# Patient Record
Sex: MALE | Race: WHITE | NOT HISPANIC OR LATINO | Employment: FULL TIME | URBAN - METROPOLITAN AREA
[De-identification: names, ages, dates, MRNs, and addresses within clinical notes are randomized per-mention and may not be internally consistent; named-entity substitution may affect disease eponyms.]

---

## 2017-02-01 DIAGNOSIS — M19.90 ARTHRITIS: ICD-10-CM

## 2017-02-01 RX ORDER — DICLOFENAC SODIUM 75 MG/1
75 TABLET, DELAYED RELEASE ORAL 2 TIMES DAILY
Qty: 180 TAB | Refills: 3 | Status: SHIPPED | OUTPATIENT
Start: 2017-02-01 | End: 2018-05-10 | Stop reason: SDUPTHER

## 2017-06-14 RX ORDER — ZOLPIDEM TARTRATE 10 MG/1
TABLET ORAL
Qty: 30 TAB | Refills: 1 | Status: SHIPPED
Start: 2017-06-14 | End: 2017-10-04 | Stop reason: SDUPTHER

## 2017-06-14 NOTE — TELEPHONE ENCOUNTER
RX FAXED TO PHARMACY    [id number] - [place], [place] - [address] AT [place] [address] [address] [place] [address]  Phone: [phone] Fax: [phone]

## 2017-08-24 ENCOUNTER — OFFICE VISIT (OUTPATIENT)
Dept: MEDICAL GROUP | Facility: PHYSICIAN GROUP | Age: 57
End: 2017-08-24
Payer: COMMERCIAL

## 2017-08-24 VITALS
WEIGHT: 252 LBS | TEMPERATURE: 97.2 F | DIASTOLIC BLOOD PRESSURE: 80 MMHG | OXYGEN SATURATION: 93 % | SYSTOLIC BLOOD PRESSURE: 124 MMHG | BODY MASS INDEX: 41.99 KG/M2 | RESPIRATION RATE: 20 BRPM | HEIGHT: 65 IN | HEART RATE: 80 BPM

## 2017-08-24 DIAGNOSIS — G89.29 CHRONIC PAIN OF BOTH KNEES: ICD-10-CM

## 2017-08-24 DIAGNOSIS — M25.561 CHRONIC PAIN OF BOTH KNEES: ICD-10-CM

## 2017-08-24 DIAGNOSIS — M25.562 CHRONIC PAIN OF BOTH KNEES: ICD-10-CM

## 2017-08-24 PROCEDURE — 99213 OFFICE O/P EST LOW 20 MIN: CPT | Performed by: FAMILY MEDICINE

## 2017-08-24 RX ORDER — LISINOPRIL 20 MG/1
20 TABLET ORAL
Refills: 5 | COMMUNITY
Start: 2017-07-06 | End: 2017-09-14

## 2017-08-24 NOTE — MR AVS SNAPSHOT
"        Shawn Duffy   2017 11:20 AM   Office Visit   MRN: [id number]    Department:  [facility]   Dept Phone:  [phone]    Description:  Male : 1960   Provider:  Bam Shin M.D.           Reason for Visit     Knee Pain bilateral knee pain, needing orthodics    Blood Pressure Problem discuss need for lisinopril      Allergies as of 2017     No Known Allergies      Vital Signs     Blood Pressure Pulse Temperature Respirations Height Weight    124/80 mmHg 80 36.2 °C (97.2 °F) 20 1.651 m (5' 5\") 114.306 kg (252 lb)    Body Mass Index Oxygen Saturation Smoking Status             41.93 kg/m2 93% Never Smoker          Basic Information     Date Of Birth Sex Race Ethnicity Preferred Language    1960 Male White Non- English      Problem List              ICD-10-CM Priority Class Noted - Resolved    Insomnia G47.00   2009 - Present    Other chronic nonalcoholic liver disease K76.89   2009 - Present    Sleep apnea G47.30   2009 - Present    Hemochromatosis E83.119   2011 - Present    Bulge of lumbar disc without myelopathy M51.26   2/3/2011 - Present    Obesity E66.9   2/3/2011 - Present    Arthritis M19.90   2013 - Present    Vitamin D deficiency disease E55.9   10/7/2013 - Present    Eosinophilic esophagitis K20.0   2015 - Present    Elevated liver function tests R94.5   1/15/2016 - Present    Macrocytosis without anemia D75.89   1/15/2016 - Present    Obesity (BMI 35.0-39.9 without comorbidity) (HCC) E66.9   2016 - Present      Health Maintenance        Date Due Completion Dates    COLONOSCOPY 2009 ---    IMM INFLUENZA (1) 2017 ---    IMM DTaP/Tdap/Td Vaccine (2 - Td) 2023            Current Immunizations     Tdap Vaccine 2013      Below and/or attached are the medications your provider expects you to take. Review all of your home medications and newly ordered medications with your provider and/or pharmacist. " Follow medication instructions as directed by your provider and/or pharmacist. Please keep your medication list with you and share with your provider. Update the information when medications are discontinued, doses are changed, or new medications (including over-the-counter products) are added; and carry medication information at all times in the event of emergency situations     Allergies:  No Known Allergies          Medications  Valid as of: August 24, 2017 - 11:50 AM    Generic Name Brand Name Tablet Size Instructions for use    Carisoprodol (Tab) SOMA 350 MG Take 1 Tab by mouth 4 times a day.        Carisoprodol (Tab) SOMA 350 MG Take 1 Tab by mouth every 8 hours as needed for Muscle Spasms.        Cholecalciferol (Tab) vitamin D3 (cholecalciferol) 1000 UNIT Take 3 Tabs by mouth every day.        ClonazePAM (Tab) KLONOPIN 0.5 MG TAKE 1/2 TO 2 TABLETS ORALLY 3 TIMES A DAY AS NEEDED        Diclofenac Sodium (Tablet Delayed Response) VOLTAREN 75 MG Take 1 Tab by mouth 2 times a day.        Fluticasone Propionate (Suspension) FLONASE 50 MCG/ACT Spray 1 Spray in nose 2 times a day. 1 Spray each nostril twice a day        Hydrocodone-Acetaminophen (Tab) NORCO 5-325 MG TAKE 1 TO 2 TABLETS BY MOUTH EVERY 4 HOURS AS NEEDED FOR PAIN        Lisinopril (Tab) PRINIVIL 20 MG Take 20 mg by mouth.        NON SPECIFIED   Therapeutic Phlebotomy--Please draw one unit of blood a month as needed for treatment of hemochromatosis.Dx E83.119  Minimum Hematocrit 35        Sildenafil Citrate (Tab) VIAGRA 100 MG Take 1 Tab by mouth as needed. FOR ERECTILE DYSFUNCTION.        Zolpidem Tartrate (Tab) AMBIEN 10 MG TAKE 1 TABLET BY MOUTH AT BEDTIME AS NEEDED FOR SLEEP        .                 Medicines prescribed today were sent to:     [id number] - [place], [place] [facility] [address] [place] [address]    Phone: [phone] Fax: [phone]    Open 24 Hours?: No      Medication refill  instructions:       If your prescription bottle indicates you have medication refills left, it is not necessary to call your provider’s office. Please contact your pharmacy and they will refill your medication.    If your prescription bottle indicates you do not have any refills left, you may request refills at any time through one of the following ways: The online TekLinks system (except Urgent Care), by calling your provider’s office, or by asking your pharmacy to contact your provider’s office with a refill request. Medication refills are processed only during regular business hours and may not be available until the next business day. Your provider may request additional information or to have a follow-up visit with you prior to refilling your medication.   *Please Note: Medication refills are assigned a new Rx number when refilled electronically. Your pharmacy may indicate that no refills were authorized even though a new prescription for the same medication is available at the pharmacy. Please request the medicine by name with the pharmacy before contacting your provider for a refill.           TekLinks Access Code: [email or web address]  Expires: 9/23/2017 11:50 AM    TekLinks  A secure, online tool to manage your health information     [employer]’s TekLinks® is a secure, online tool that connects you to your personalized health information from the privacy of your home -- day or night - making it very easy for you to manage your healthcare. Once the activation process is completed, you can even access your medical information using the TekLinks benedict, which is available for free in the Apple Benedict store or Google Play store.     TekLinks provides the following levels of access (as shown below):   My Chart Features   [facility] Doctor [facility]  Specialists [facility]  Urgent  Care Non-[facility]  Primary Care  Doctor   Email your healthcare team securely and privately 24/7 X X X    Manage appointments: schedule your next  appointment; view details of past/upcoming appointments X      Request prescription refills. X      View recent personal medical records, including lab and immunizations X X X X   View health record, including health history, allergies, medications X X X X   Read reports about your outpatient visits, procedures, consult and ER notes X X X X   See your discharge summary, which is a recap of your hospital and/or ER visit that includes your diagnosis, lab results, and care plan. X X       How to register for Curious Hat:  1. Go to  [email or web address].  2. Click on the Sign Up Now box, which takes you to the New Member Sign Up page. You will need to provide the following information:  a. Enter your Curious Hat Access Code exactly as it appears at the top of this page. (You will not need to use this code after you’ve completed the sign-up process. If you do not sign up before the expiration date, you must request a new code.)   b. Enter your date of birth.   c. Enter your home email address.   d. Click Submit, and follow the next screen’s instructions.  3. Create a Curious Hat ID. This will be your Curious Hat login ID and cannot be changed, so think of one that is secure and easy to remember.  4. Create a Curious Hat password. You can change your password at any time.  5. Enter your Password Reset Question and Answer. This can be used at a later time if you forget your password.   6. Enter your e-mail address. This allows you to receive e-mail notifications when new information is available in Curious Hat.  7. Click Sign Up. You can now view your health information.    For assistance activating your Curious Hat account, call [phone]

## 2017-08-24 NOTE — Clinical Note
[facility]  Justin Santacruz M.D.  [address]  [place] [address]  Fax: [phone]   Authorization for Release/Disclosure of   Protected Health Information   Name: LEA DUFFY : 1960 SSN: [id number]   Address: [address] [place] [address] [place] [address] Phone:    [phone] (home)    I authorize the entity listed below to release/disclose the PHI below to:   [facility]/Justin Santacruz M.D. and Bam Shin M.D.   Provider or Entity Name:  [facility]   City, [facility], Zip  [place] Phone:      Fax:     Reason for request: continuity of care   Information to be released:    [  ] LAST COLONOSCOPY,  including any PATH REPORT and follow-up  [  ] LAST FIT/COLOGUARD RESULT [  ] LAST DEXA  [  ] LAST MAMMOGRAM  [  ] LAST PAP  [  ] LAST LABS [  ] RETINA EXAM REPORT  [  ] IMMUNIZATION RECORDS  [xx  ] Release all info xray reports      [  ] Check here and initial the line next to each item to release ALL health information INCLUDING  _____ Care and treatment for drug and / or alcohol abuse  _____ HIV testing, infection status, or AIDS  _____ Genetic Testing    DATES OF SERVICE OR TIME PERIOD TO BE DISCLOSED: _____________  I understand and acknowledge that:  * This Authorization may be revoked at any time by you in writing, except if your health information has already been used or disclosed.  * Your health information that will be used or disclosed as a result of you signing this authorization could be re-disclosed by the recipient. If this occurs, your re-disclosed health information may no longer be protected by State or Federal laws.  * You may refuse to sign this Authorization. Your refusal will not affect your ability to obtain treatment.  * This Authorization becomes effective upon signing and will  on (date) __________.      If no date is indicated, this Authorization will  one (1) year from the signature date.    Name: Lea Duffy    Signature:   Date:     8/24/2017       PLEASE FAX REQUESTED RECORDS BACK TO: [phone]

## 2017-08-25 NOTE — PROGRESS NOTES
Subjective:   Shawn Duffy is a 57 y.o. male here today for requesting prescription for shoe inserts for chronic knee pain    HPI :     Patient has been having chronic knee pain for a few years.[facility] .He has recently been having chronic knee pain bilateral.Has had recent X rays which showed moderate arthritis.  He is doing Physical Therapy and shoe inserts were recommended to him.But the prescription has to come from a physician.  Therefore he is here requesting prescription for shoe inserts bilateral for patellar positioning and Q angle correction.    Current medicines (including changes today)  Current Outpatient Prescriptions   Medication Sig Dispense Refill   • lisinopril (PRINIVIL) 20 MG Tab Take 20 mg by mouth.  5   • zolpidem (AMBIEN) 10 MG Tab TAKE 1 TABLET BY MOUTH AT BEDTIME AS NEEDED FOR SLEEP 30 Tab 1   • diclofenac EC (VOLTAREN) 75 MG Tablet Delayed Response Take 1 Tab by mouth 2 times a day. 180 Tab 3   • vitamin D3, cholecalciferol, 1000 UNIT Tab Take 3 Tabs by mouth every day. 100 Tab 3   • carisoprodol (SOMA) 350 MG Tab Take 1 Tab by mouth every 8 hours as needed for Muscle Spasms. 30 Tab 0   • clonazepam (KLONOPIN) 0.5 MG Tab TAKE 1/2 TO 2 TABLETS ORALLY 3 TIMES A DAY AS NEEDED 40 Tab 0   • hydrocodone-acetaminophen (NORCO) 5-325 MG Tab per tablet TAKE 1 TO 2 TABLETS BY MOUTH EVERY 4 HOURS AS NEEDED FOR PAIN 40 Tab 0   • sildenafil citrate (VIAGRA) 100 MG tablet Take 1 Tab by mouth as needed. FOR ERECTILE DYSFUNCTION. 18 Tab 4   • carisoprodol (SOMA) 350 MG Tab Take 1 Tab by mouth 4 times a day. 40 Tab 2   • NON SPECIFIED Therapeutic Phlebotomy--Please draw one unit of blood a month as needed for treatment of hemochromatosis.Dx E83.119  Minimum Hematocrit 35 12 Units 1   • fluticasone (FLONASE) 50 MCG/ACT nasal spray Spray 1 Spray in nose 2 times a day. 1 Spray each nostril twice a day 1 Bottle 3     No current facility-administered  "medications for this visit.     He  has a past medical history of Disorders of iron metabolism; Obesity, unspecified; Other chronic nonalcoholic liver disease; Organic sleep apnea, unspecified; Reflux esophagitis; and GERD (gastroesophageal reflux disease).    ROS   No chest pain, no shortness of breath, no abdominal pain         Objective:     Blood pressure 124/80, pulse 80, temperature 36.2 °C (97.2 °F), resp. rate 20, height 1.651 m (5' 5\"), weight 114.306 kg (252 lb), SpO2 93 %. Body mass index is 41.93 kg/(m^2).     PHYSICAL EXAM     GEN: Alert and oriented,well appearing, no acute distress  SKIN: warm, dry to touch, no rashes or lesions in visible areas  PSYCH: mood and affect normal, judgement normal  MUSCULOSKELETAL : Normal gait and stance, Knee exam : no TTP, normal ROM  NEURO: No overt focal neurologic deficits,sensation intact      Assessment and Plan:   The following treatment plan was discussed    1. Chronic pain of both knees  Continue Physical Therapy  Prescription given for shoe inserts with detailed instructions as requested      Followup: Return in about 4 weeks (around 9/21/2017) for Long,establish care.         Please note that this dictation was created using voice recognition software. I have made every reasonable attempt to correct obvious errors, but I expect that there are errors of grammar and possibly content that I did not discover before finalizing the note.    "

## 2017-09-05 PROBLEM — M17.0 OSTEOARTHRITIS OF KNEES, BILATERAL: Status: ACTIVE | Noted: 2017-09-05

## 2017-09-14 ENCOUNTER — HOSPITAL ENCOUNTER (OUTPATIENT)
Dept: LAB | Facility: MEDICAL CENTER | Age: 57
End: 2017-09-14
Attending: FAMILY MEDICINE
Payer: COMMERCIAL

## 2017-09-14 ENCOUNTER — OFFICE VISIT (OUTPATIENT)
Dept: MEDICAL GROUP | Facility: PHYSICIAN GROUP | Age: 57
End: 2017-09-14
Payer: COMMERCIAL

## 2017-09-14 VITALS
WEIGHT: 250 LBS | HEIGHT: 65 IN | DIASTOLIC BLOOD PRESSURE: 74 MMHG | OXYGEN SATURATION: 95 % | BODY MASS INDEX: 41.65 KG/M2 | SYSTOLIC BLOOD PRESSURE: 124 MMHG | TEMPERATURE: 97.7 F | HEART RATE: 89 BPM

## 2017-09-14 DIAGNOSIS — M54.50 CHRONIC RIGHT-SIDED LOW BACK PAIN WITHOUT SCIATICA: ICD-10-CM

## 2017-09-14 DIAGNOSIS — Z23 NEED FOR VACCINATION: ICD-10-CM

## 2017-09-14 DIAGNOSIS — Z12.11 SCREEN FOR COLON CANCER: ICD-10-CM

## 2017-09-14 DIAGNOSIS — E55.9 VITAMIN D DEFICIENCY DISEASE: ICD-10-CM

## 2017-09-14 DIAGNOSIS — E83.110 HEREDITARY HEMOCHROMATOSIS (HCC): ICD-10-CM

## 2017-09-14 DIAGNOSIS — G89.29 CHRONIC RIGHT-SIDED LOW BACK PAIN WITHOUT SCIATICA: ICD-10-CM

## 2017-09-14 DIAGNOSIS — F51.01 PRIMARY INSOMNIA: ICD-10-CM

## 2017-09-14 DIAGNOSIS — M17.0 OSTEOARTHRITIS OF BOTH KNEES, UNSPECIFIED OSTEOARTHRITIS TYPE: ICD-10-CM

## 2017-09-14 DIAGNOSIS — E66.01 MORBID OBESITY WITH BMI OF 40.0-44.9, ADULT (HCC): ICD-10-CM

## 2017-09-14 DIAGNOSIS — G47.33 OBSTRUCTIVE SLEEP APNEA SYNDROME: ICD-10-CM

## 2017-09-14 PROBLEM — M25.561 CHRONIC PAIN OF BOTH KNEES: Status: RESOLVED | Noted: 2017-08-24 | Resolved: 2017-09-14

## 2017-09-14 PROBLEM — M25.562 CHRONIC PAIN OF BOTH KNEES: Status: RESOLVED | Noted: 2017-08-24 | Resolved: 2017-09-14

## 2017-09-14 LAB
ALBUMIN SERPL BCP-MCNC: 4.2 G/DL (ref 3.2–4.9)
ALBUMIN/GLOB SERPL: 1.2 G/DL
ALP SERPL-CCNC: 80 U/L (ref 30–99)
ALT SERPL-CCNC: 45 U/L (ref 2–50)
ANION GAP SERPL CALC-SCNC: 9 MMOL/L (ref 0–11.9)
AST SERPL-CCNC: 34 U/L (ref 12–45)
BASOPHILS # BLD AUTO: 0.6 % (ref 0–1.8)
BASOPHILS # BLD: 0.05 K/UL (ref 0–0.12)
BILIRUB SERPL-MCNC: 1.5 MG/DL (ref 0.1–1.5)
BUN SERPL-MCNC: 15 MG/DL (ref 8–22)
CALCIUM SERPL-MCNC: 9.7 MG/DL (ref 8.5–10.5)
CHLORIDE SERPL-SCNC: 106 MMOL/L (ref 96–112)
CO2 SERPL-SCNC: 24 MMOL/L (ref 20–33)
CREAT SERPL-MCNC: 0.82 MG/DL (ref 0.5–1.4)
EOSINOPHIL # BLD AUTO: 0.44 K/UL (ref 0–0.51)
EOSINOPHIL NFR BLD: 5.1 % (ref 0–6.9)
ERYTHROCYTE [DISTWIDTH] IN BLOOD BY AUTOMATED COUNT: 45.1 FL (ref 35.9–50)
GFR SERPL CREATININE-BSD FRML MDRD: >60 ML/MIN/1.73 M 2
GLOBULIN SER CALC-MCNC: 3.4 G/DL (ref 1.9–3.5)
GLUCOSE SERPL-MCNC: 90 MG/DL (ref 65–99)
HCT VFR BLD AUTO: 45.8 % (ref 42–52)
HGB BLD-MCNC: 15.8 G/DL (ref 14–18)
IMM GRANULOCYTES # BLD AUTO: 0.03 K/UL (ref 0–0.11)
IMM GRANULOCYTES NFR BLD AUTO: 0.3 % (ref 0–0.9)
LYMPHOCYTES # BLD AUTO: 1.92 K/UL (ref 1–4.8)
LYMPHOCYTES NFR BLD: 22 % (ref 22–41)
MCH RBC QN AUTO: 34.4 PG (ref 27–33)
MCHC RBC AUTO-ENTMCNC: 34.5 G/DL (ref 33.7–35.3)
MCV RBC AUTO: 99.8 FL (ref 81.4–97.8)
MONOCYTES # BLD AUTO: 0.77 K/UL (ref 0–0.85)
MONOCYTES NFR BLD AUTO: 8.8 % (ref 0–13.4)
NEUTROPHILS # BLD AUTO: 5.5 K/UL (ref 1.82–7.42)
NEUTROPHILS NFR BLD: 63.2 % (ref 44–72)
NRBC # BLD AUTO: 0 K/UL
NRBC BLD AUTO-RTO: 0 /100 WBC
PLATELET # BLD AUTO: 221 K/UL (ref 164–446)
PMV BLD AUTO: 10.8 FL (ref 9–12.9)
POTASSIUM SERPL-SCNC: 4 MMOL/L (ref 3.6–5.5)
PROT SERPL-MCNC: 7.6 G/DL (ref 6–8.2)
RBC # BLD AUTO: 4.59 M/UL (ref 4.7–6.1)
SODIUM SERPL-SCNC: 139 MMOL/L (ref 135–145)
WBC # BLD AUTO: 8.7 K/UL (ref 4.8–10.8)

## 2017-09-14 PROCEDURE — 36415 COLL VENOUS BLD VENIPUNCTURE: CPT

## 2017-09-14 PROCEDURE — 90471 IMMUNIZATION ADMIN: CPT | Performed by: FAMILY MEDICINE

## 2017-09-14 PROCEDURE — 99214 OFFICE O/P EST MOD 30 MIN: CPT | Mod: 25 | Performed by: FAMILY MEDICINE

## 2017-09-14 PROCEDURE — 85025 COMPLETE CBC W/AUTO DIFF WBC: CPT

## 2017-09-14 PROCEDURE — 90686 IIV4 VACC NO PRSV 0.5 ML IM: CPT | Performed by: FAMILY MEDICINE

## 2017-09-14 PROCEDURE — 80053 COMPREHEN METABOLIC PANEL: CPT

## 2017-09-14 RX ORDER — METHOCARBAMOL 500 MG/1
1000 TABLET, FILM COATED ORAL 3 TIMES DAILY
Qty: 60 TAB | Refills: 0 | Status: SHIPPED | OUTPATIENT
Start: 2017-09-14 | End: 2018-02-08

## 2017-09-14 ASSESSMENT — PAIN SCALES - GENERAL: PAINLEVEL: 5=MODERATE PAIN

## 2017-09-14 NOTE — ASSESSMENT & PLAN NOTE
Patient does have chronic low back pain with right sided sciatica.He had in the past seen Spine specialist at [facility] and had received epidural injection. Lately it was not bothering him much except it started to act up recently 4 months ago.Pain mainly in the low back with occasional radiation to the right buttock.MRI in 2011 showed disc bulge at L5-S1 with severe neural foraminal stenosis.Patient is currently managing pain by taking Voltaren 75 twice daily and Robaxin as needed.

## 2017-09-14 NOTE — ASSESSMENT & PLAN NOTE
This is an ongoing issue. Patient has been on Ambien for several years. He usually gets prescription of Ambien 10 mg 15 pills for 25 days. He takes half a tablet daily and that helps him sleep.

## 2017-09-14 NOTE — ASSESSMENT & PLAN NOTE
Patient has sleep apnea. And he uses the CPAP regularly. Reports being compliant and uses it throughout the night. Reports that using the CPAP and taking Ambien to help him fall asleep works well for him.

## 2017-09-14 NOTE — ASSESSMENT & PLAN NOTE
Patient's last vitamin D level in December 2016 was low at 8. He is currently taking vitamin D 3000 units daily. We will recheck vitamin D level.

## 2017-09-17 NOTE — ASSESSMENT & PLAN NOTE
Ongoing issue.X ray showed mild arthritis in left leg.He is currently doing physical therapy for it which seems to be helping.

## 2017-09-17 NOTE — PROGRESS NOTES
Subjective:   Shawn Duffy is a 57 y.o. male here today for establishing care and discuss following chronic medical conditions :    Chronic right-sided low back pain without sciatica  Patient does have chronic low back pain with right sided sciatica.He had in the past seen Spine specialist at [facility] and had received epidural injection. Lately it was not bothering him much except it started to act up recently 4 months ago.Pain mainly in the low back with occasional radiation to the right buttock.MRI in 2011 showed disc bulge at L5-S1 with severe neural foraminal stenosis.Patient is currently managing pain by taking Voltaren 75 twice daily and Robaxin as needed.    Insomnia  This is an ongoing issue. Patient has been on Ambien for several years. He usually gets prescription of Ambien 10 mg 15 pills for 25 days. He takes half a tablet daily and that helps him sleep.    Sleep Apnea  Patient has sleep apnea. And he uses the CPAP regularly. Reports being compliant and uses it throughout the night. Reports that using the CPAP and taking Ambien to help him fall asleep works well for him.    Vitamin D deficiency disease  Patient's last vitamin D level in December 2016 was low at 8. He is currently taking vitamin D 3000 units daily. We will recheck vitamin D level.    Hemochromatosis  Patient reports that he was diagnosed with Hemochromatosis several years back.Last year he was undergoing phlebotomy every 4 weeks. But recently based on labs, he was told by his previous PCP that he did not need phlebotomy.No recent CBC and iron studies on chart in 2017.He denies fatigue, does have ongoing knee pain.    Osteoarthritis of knees, bilateral  Ongoing issue.X ray showed mild arthritis in left leg.He is currently doing physical therapy for it which seems to be helping.         Current medicines (including changes today)  Current Outpatient Prescriptions   Medication Sig Dispense Refill   • methocarbamol (ROBAXIN) 500 MG  "Tab Take 2 Tabs by mouth 3 times a day. 60 Tab 0   • zolpidem (AMBIEN) 10 MG Tab TAKE 1 TABLET BY MOUTH AT BEDTIME AS NEEDED FOR SLEEP 30 Tab 1   • diclofenac EC (VOLTAREN) 75 MG Tablet Delayed Response Take 1 Tab by mouth 2 times a day. 180 Tab 3   • vitamin D3, cholecalciferol, 1000 UNIT Tab Take 3 Tabs by mouth every day. 100 Tab 3     No current facility-administered medications for this visit.      He  has a past medical history of Disorders of iron metabolism; GERD (gastroesophageal reflux disease); Obesity, unspecified; Organic sleep apnea, unspecified; Other chronic nonalcoholic liver disease; and Reflux esophagitis.    ROS  No chest pain, no shortness of breath, no abdominal pain, no cough, no fatigue       Objective:     Blood pressure 124/74, pulse 89, temperature 36.5 °C (97.7 °F), height 1.651 m (5' 5\"), weight 113.4 kg (250 lb), SpO2 95 %. Body mass index is 41.6 kg/m².     PHYSICAL EXAM     GEN: Alert and oriented,well appearing, no acute distress  SKIN: warm, dry to touch, no rashes or lesions in visible areas  PSYCH: mood and affect normal, judgement normal  EYES: Conjunctiva clear, lids normal,pupils equal round and reactive  ENMT: Normal external nose and ears,EACs normal appearing, TM pearly gray with normal light reflex bilaterally,nasal mucosa and turbinates normal appearing without erythema or edema, lips without lesions,good dentition,oropharynx clear  Neck : Trachea midline, no masses or swelling, no thyromegaly  LYMPHATIC : No cervical or supraclavicular lymphadenopathy  RESPIRATORY : Unlabored respiratory effort, no distress noted, clear to auscultation bilaterally, no wheeze, rhonchi, crackles  CARDIOVASCULAR: RRR, S1 S2 normal, no murmurs ,no edema of the extremities  MUSCULOSKELETAL : Normal gait and stance, no obvious abnormalities   NEURO: No overt focal neurologic deficits,sensation intact      Assessment and Plan:   The following treatment plan was discussed    1. Chronic " right-sided low back pain without sciatica  New problem to me.Recently been worse.  Referral placed to Neurosurgery.Patient has seen them in the past.  - REFERRAL TO NEUROSURGERY    2. Primary insomnia  Stable.Continue Ambien 5 mg QHS.Cautioned regarding long term side effects.    3. Obstructive sleep apnea syndrome  Continue CPAP.O2 sat in clinic 95 %    4. Morbid obesity with BMI of 40.0-44.9, adult (CMS-HCC)  - Patient identified as having weight management issue.  Appropriate orders and counseling given.    5. Vitamin D deficiency disease  Continue Vit D.Will check current level  - VITAMIN D,25 HYDROXY; Future    6. Screen for colon cancer  - OCCULT BLOOD FECES IMMUNOASSAY; Future    7. Hereditary hemochromatosis (CMS-HCC)  Labs ordered to evaluate for the need for phlebotomy.  Patient asymptomatic  - CBC WITH DIFFERENTIAL; Future  - COMP METABOLIC PANEL; Future  - IRON/TOTAL IRON BIND; Future  - FERRITIN; Future    8. Need for vaccination  - Flu Quad Inj >3 Year Pre-Filled PF    9. Osteoarthritis of both knees, unspecified osteoarthritis type  Continue Physical Therapy      Followup: Return in about 6 months (around 3/14/2018), or if symptoms worsen or fail to improve, for follow up on chronic med conditions, Short.         Please note that this dictation was created using voice recognition software. I have made every reasonable attempt to correct obvious errors, but I expect that there are errors of grammar and possibly content that I did not discover before finalizing the note.

## 2017-09-17 NOTE — ASSESSMENT & PLAN NOTE
Patient reports that he was diagnosed with Hemochromatosis several years back.Last year he was undergoing phlebotomy every 4 weeks. But recently based on labs, he was told by his previous PCP that he did not need phlebotomy.No recent CBC and iron studies on chart in 2017.He denies fatigue, does have ongoing knee pain.

## 2017-09-18 ENCOUNTER — TELEPHONE (OUTPATIENT)
Dept: MEDICAL GROUP | Facility: PHYSICIAN GROUP | Age: 57
End: 2017-09-18

## 2017-09-21 ENCOUNTER — HOSPITAL ENCOUNTER (OUTPATIENT)
Dept: LAB | Facility: MEDICAL CENTER | Age: 57
End: 2017-09-21
Attending: FAMILY MEDICINE
Payer: COMMERCIAL

## 2017-09-21 DIAGNOSIS — E83.110 HEREDITARY HEMOCHROMATOSIS (HCC): ICD-10-CM

## 2017-09-21 DIAGNOSIS — E55.9 VITAMIN D DEFICIENCY DISEASE: ICD-10-CM

## 2017-09-21 LAB
25(OH)D3 SERPL-MCNC: 14 NG/ML (ref 30–100)
FERRITIN SERPL-MCNC: 82.6 NG/ML (ref 22–322)
IRON SATN MFR SERPL: 43 % (ref 15–55)
IRON SERPL-MCNC: 149 UG/DL (ref 50–180)
TIBC SERPL-MCNC: 344 UG/DL (ref 250–450)

## 2017-09-21 PROCEDURE — 83550 IRON BINDING TEST: CPT

## 2017-09-21 PROCEDURE — 82306 VITAMIN D 25 HYDROXY: CPT

## 2017-09-21 PROCEDURE — 82728 ASSAY OF FERRITIN: CPT

## 2017-09-21 PROCEDURE — 83540 ASSAY OF IRON: CPT

## 2017-09-22 ENCOUNTER — TELEPHONE (OUTPATIENT)
Dept: MEDICAL GROUP | Facility: PHYSICIAN GROUP | Age: 57
End: 2017-09-22

## 2017-09-22 NOTE — TELEPHONE ENCOUNTER
----- Message from Bam Shin M.D. sent at 9/21/2017 10:13 PM PDT -----  Please inform patient that his labs show the following :    1. Vitamin D level is improved from before but continues to be still lower than normal.Please advise him to take Vitamin D 5000 units daily.We will recheck level in 3 months.I will place a lab order.    2. His iron studies are absolutely within normal range.Therefore he does not need to go to the blood bank at this time for phlebotomy (procedure for letting blood out). However I would like to monitor and therefore would repeat the labs in 3 months.    I will place orders so he can get them done in 3 months.    Bam Shin M.D.

## 2017-10-05 RX ORDER — ZOLPIDEM TARTRATE 10 MG/1
TABLET ORAL
Qty: 30 EACH | Refills: 0 | Status: SHIPPED
Start: 2017-10-05 | End: 2017-12-01 | Stop reason: SDUPTHER

## 2017-12-01 RX ORDER — ZOLPIDEM TARTRATE 10 MG/1
TABLET ORAL
Qty: 30 EACH | Refills: 0 | Status: SHIPPED
Start: 2017-12-01 | End: 2018-02-08 | Stop reason: SDUPTHER

## 2018-01-28 DIAGNOSIS — F51.01 PRIMARY INSOMNIA: ICD-10-CM

## 2018-01-29 ENCOUNTER — TELEPHONE (OUTPATIENT)
Dept: MEDICAL GROUP | Facility: PHYSICIAN GROUP | Age: 58
End: 2018-01-29

## 2018-01-29 RX ORDER — ZOLPIDEM TARTRATE 10 MG/1
TABLET ORAL
Qty: 30 TAB | Refills: 0
Start: 2018-01-29

## 2018-01-30 NOTE — TELEPHONE ENCOUNTER
Patient needs appointment for Ambien prescription in order to comply by current regulations.    Bam Shin M.D.

## 2018-01-30 NOTE — TELEPHONE ENCOUNTER
As far as our last discussion in his last visit, he does not have any pain in his feet that I recall.I do not have any diagnosis that I can provide related to his feet unless he has problems.The insert that he had requested prescription for, was recommended by his physical therapist, therefore he can consult with his therapist to discuss.If he has any feet problems, would need to see him in clinic to discuss, since this would be new.    Bam Shin M.D.

## 2018-02-08 ENCOUNTER — OFFICE VISIT (OUTPATIENT)
Dept: MEDICAL GROUP | Facility: PHYSICIAN GROUP | Age: 58
End: 2018-02-08
Payer: COMMERCIAL

## 2018-02-08 VITALS
HEIGHT: 65 IN | DIASTOLIC BLOOD PRESSURE: 80 MMHG | BODY MASS INDEX: 41.65 KG/M2 | WEIGHT: 250 LBS | TEMPERATURE: 98.1 F | OXYGEN SATURATION: 95 % | SYSTOLIC BLOOD PRESSURE: 142 MMHG | HEART RATE: 95 BPM

## 2018-02-08 DIAGNOSIS — M54.50 CHRONIC RIGHT-SIDED LOW BACK PAIN WITHOUT SCIATICA: ICD-10-CM

## 2018-02-08 DIAGNOSIS — G89.29 CHRONIC RIGHT-SIDED LOW BACK PAIN WITHOUT SCIATICA: ICD-10-CM

## 2018-02-08 DIAGNOSIS — G47.33 OBSTRUCTIVE SLEEP APNEA SYNDROME: ICD-10-CM

## 2018-02-08 DIAGNOSIS — F51.01 PRIMARY INSOMNIA: ICD-10-CM

## 2018-02-08 DIAGNOSIS — Z79.899 CHRONIC PRESCRIPTION BENZODIAZEPINE USE: ICD-10-CM

## 2018-02-08 DIAGNOSIS — E55.9 VITAMIN D DEFICIENCY DISEASE: ICD-10-CM

## 2018-02-08 PROCEDURE — 99214 OFFICE O/P EST MOD 30 MIN: CPT | Performed by: FAMILY MEDICINE

## 2018-02-08 RX ORDER — ZOLPIDEM TARTRATE 5 MG/1
5 TABLET ORAL NIGHTLY PRN
Qty: 30 TAB | Refills: 0 | Status: SHIPPED | OUTPATIENT
Start: 2018-04-11 | End: 2018-02-12 | Stop reason: SDUPTHER

## 2018-02-08 RX ORDER — ZOLPIDEM TARTRATE 5 MG/1
5 TABLET ORAL NIGHTLY PRN
Qty: 30 TAB | Refills: 0 | Status: SHIPPED | OUTPATIENT
Start: 2018-02-08 | End: 2018-02-08 | Stop reason: SDUPTHER

## 2018-02-08 RX ORDER — AMITRIPTYLINE HYDROCHLORIDE 50 MG/1
50 TABLET, FILM COATED ORAL NIGHTLY PRN
Qty: 30 TAB | Refills: 0 | Status: SHIPPED | OUTPATIENT
Start: 2018-02-08 | End: 2018-03-08 | Stop reason: SDUPTHER

## 2018-02-08 RX ORDER — CHOLECALCIFEROL (VITAMIN D3) 50 MCG
TABLET ORAL DAILY
COMMUNITY
End: 2019-11-03

## 2018-02-08 RX ORDER — ZOLPIDEM TARTRATE 5 MG/1
5 TABLET ORAL NIGHTLY PRN
Qty: 30 TAB | Refills: 0 | Status: SHIPPED | OUTPATIENT
Start: 2018-03-11 | End: 2018-02-08 | Stop reason: SDUPTHER

## 2018-02-08 ASSESSMENT — PAIN SCALES - GENERAL: PAINLEVEL: NO PAIN

## 2018-02-08 ASSESSMENT — PATIENT HEALTH QUESTIONNAIRE - PHQ9: CLINICAL INTERPRETATION OF PHQ2 SCORE: 0

## 2018-02-09 ENCOUNTER — TELEPHONE (OUTPATIENT)
Dept: MEDICAL GROUP | Facility: PHYSICIAN GROUP | Age: 58
End: 2018-02-09

## 2018-02-09 DIAGNOSIS — F51.01 PRIMARY INSOMNIA: ICD-10-CM

## 2018-02-09 NOTE — TELEPHONE ENCOUNTER
1. Caller Name: Shawn Duffy                                         Call Back Number: [phone] (home)       Patient approves a detailed voicemail message: N\A    Pharmacy sent informing that the insurance only covers 15 tabs of Zolpidem per 30 days. Requesting Zolpidem 10 mg with a SIG take 1/2 tab night please send a new Rx with this correction

## 2018-02-10 NOTE — PROGRESS NOTES
"Subjective:   Shawn Duffy is a 57 y.o. male here today for follow up on insomnia and medication refill    Insomnia  Has been on Ambien 5 mg for 4 years.  Trazodone did not make him feel good  Amitript       HPI :    Patient has insomnia and has been taking Ambien 5 mg every bedtime that helps him fall asleep.Does not experience any untoward side effects from medication.Has been on it for several years.Has tried OTC sleep aids in the past and also tried Trazodone which did not make him feel good.He has sleep apnea and uses CPAP regularly.  Sleeps well with ambien and does not get up in the middle of the night.Ran out of prescription recently.    He also has chronic back pain and being treated at [facility].  MRI results :  Severe bilateral foraminal stenosis at L5-S1 with grade 1 spondylolisthesis.    Epidural corticosteroid injections have not given much relief.Considering lumbar fusion surgery.    Current medicines (including changes today)  Current Outpatient Prescriptions   Medication Sig Dispense Refill   • Cholecalciferol (VITAMIN D) 2000 UNIT Tab Take  by mouth every day.     • amitriptyline (ELAVIL) 50 MG Tab Take 1 Tab by mouth at bedtime as needed. 30 Tab 0   • [START ON 4/11/2018] zolpidem (AMBIEN) 5 MG Tab Take 1 Tab by mouth at bedtime as needed for Sleep for up to 30 days. I 30 Tab 0   • diclofenac EC (VOLTAREN) 75 MG Tablet Delayed Response Take 1 Tab by mouth 2 times a day. 180 Tab 3     No current facility-administered medications for this visit.      He  has a past medical history of Disorders of iron metabolism; GERD (gastroesophageal reflux disease); Obesity, unspecified; Organic sleep apnea, unspecified; Other chronic nonalcoholic liver disease; and Reflux esophagitis.    ROS  No chest pain, no shortness of breath, no abdominal pain  No weakness     Objective:     Blood pressure 142/80, pulse 95, temperature 36.7 °C (98.1 °F), height 1.651 m (5' 5\"), weight 113.4 kg (250 lb), SpO2 95 %. Body " mass index is 41.6 kg/m².     PHYSICAL EXAM     GEN: Alert and oriented,well appearing, no acute distress  SKIN: warm, dry to touch, no rashes or lesions in visible areas  PSYCH: mood and affect normal, judgement normal  RESPIRATORY : Unlabored respiratory effort, no distress noted, clear to auscultation bilaterally, no wheeze, rhonchi, crackles  CARDIOVASCULAR: RRR, S1 S2 normal, no murmurs   MUSCULOSKELETAL : Normal gait and stance, no obvious abnormalities   NEURO: No overt focal neurologic deficits,sensation intact      Assessment and Plan:   The following treatment plan was discussed    1. Primary insomnia  Chronic problem.Well controlled on Ambien 5 mg daily.Patient aware of the risks and adverse effects of Ambien.Agreeable to try alternative.I will prescribe him a few tablets of amitriptyline to try instead of Ambien and monitor symptoms.Meanwhile will continue Ambien as patient has been on medication for many years and has not experienced any side effects.Rx for Ambien given today.  - zolpidem (AMBIEN) 5 MG Tab; Take 1 Tab by mouth at bedtime as needed for Sleep for up to 30 days. I  Dispense: 30 Tab; Refill: 0    2. Chronic prescription benzodiazepine use  In prescribing controlled substances to this patient, I certify that I have obtained and reviewed the medical history of Shawn Duffy. I have also made a good aditya effort to obtain applicable records from other providers who have treated the patient.  I have conducted a physical exam and documented it. I have reviewed Mr. Duffy’s prescription history as maintained by the [place] Prescription Monitoring Program.     Given the above, I believe the benefits of controlled substance therapy outweigh the risks.Accordingly, I have discussed the risk and benefits, treatment plan, and alternative therapies with the patient.   CS agreement signed today.  - Controlled Substance Treatment Agreement    3. Obstructive sleep apnea syndrome  Chronic problem.Well  controlled.Continue CPAP.    4. Chronic right-sided low back pain without sciatica  Chronic problem.Ongoing management at [facility].Reviewed records.    5. Vitamin D deficiency disease  Continue Vitamin D supplements.Repeat labs ordered.    Followup: Return in about 3 months (around 5/8/2018).         Please note that this dictation was created using voice recognition software. I have made every reasonable attempt to correct obvious errors, but I expect that there are errors of grammar and possibly content that I did not discover before finalizing the note.

## 2018-02-12 RX ORDER — ZOLPIDEM TARTRATE 10 MG/1
5 TABLET ORAL NIGHTLY PRN
Qty: 45 TAB | Refills: 0 | Status: SHIPPED | OUTPATIENT
Start: 2018-02-25 | End: 2018-05-10

## 2018-02-12 NOTE — TELEPHONE ENCOUNTER
Rx done for 90 days, 45 tabs of 10 mg tabs.Should be good until 05/26/18.Patient also needs to sign the agreement.    Bam Shin M.D.

## 2018-03-09 RX ORDER — AMITRIPTYLINE HYDROCHLORIDE 50 MG/1
50 TABLET, FILM COATED ORAL NIGHTLY PRN
Qty: 30 TAB | Refills: 5 | Status: SHIPPED | OUTPATIENT
Start: 2018-03-09 | End: 2018-05-13

## 2018-03-29 RX ORDER — AMITRIPTYLINE HYDROCHLORIDE 50 MG/1
50 TABLET, FILM COATED ORAL NIGHTLY PRN
Qty: 90 TAB | Refills: 1 | Status: SHIPPED | OUTPATIENT
Start: 2018-03-29 | End: 2018-09-26 | Stop reason: SDUPTHER

## 2018-05-03 ENCOUNTER — APPOINTMENT (OUTPATIENT)
Dept: MEDICAL GROUP | Facility: PHYSICIAN GROUP | Age: 58
End: 2018-05-03
Payer: COMMERCIAL

## 2018-05-10 ENCOUNTER — OFFICE VISIT (OUTPATIENT)
Dept: MEDICAL GROUP | Facility: PHYSICIAN GROUP | Age: 58
End: 2018-05-10
Payer: COMMERCIAL

## 2018-05-10 VITALS
BODY MASS INDEX: 41.82 KG/M2 | HEIGHT: 65 IN | SYSTOLIC BLOOD PRESSURE: 146 MMHG | DIASTOLIC BLOOD PRESSURE: 92 MMHG | TEMPERATURE: 97.7 F | OXYGEN SATURATION: 95 % | HEART RATE: 93 BPM | WEIGHT: 251 LBS

## 2018-05-10 DIAGNOSIS — E66.01 MORBID OBESITY WITH BMI OF 40.0-44.9, ADULT (HCC): ICD-10-CM

## 2018-05-10 DIAGNOSIS — M17.0 PRIMARY OSTEOARTHRITIS OF BOTH KNEES: ICD-10-CM

## 2018-05-10 DIAGNOSIS — F51.01 PRIMARY INSOMNIA: ICD-10-CM

## 2018-05-10 PROBLEM — Z79.899 CHRONIC PRESCRIPTION BENZODIAZEPINE USE: Status: RESOLVED | Noted: 2018-02-08 | Resolved: 2018-05-10

## 2018-05-10 PROCEDURE — 20610 DRAIN/INJ JOINT/BURSA W/O US: CPT | Performed by: FAMILY MEDICINE

## 2018-05-10 PROCEDURE — 99214 OFFICE O/P EST MOD 30 MIN: CPT | Mod: 25 | Performed by: FAMILY MEDICINE

## 2018-05-10 RX ORDER — DICLOFENAC SODIUM 75 MG/1
75 TABLET, DELAYED RELEASE ORAL 2 TIMES DAILY
Qty: 180 TAB | Refills: 3 | Status: SHIPPED | OUTPATIENT
Start: 2018-05-10 | End: 2019-04-22 | Stop reason: SDUPTHER

## 2018-05-10 ASSESSMENT — PAIN SCALES - GENERAL: PAINLEVEL: 9=SEVERE PAIN

## 2018-05-13 NOTE — PROGRESS NOTES
"Subjective:   Shawn Duffy is a 57 y.o. male here today for follow up and B/L knee pain    HPI :     Chronic B/L knee pain.X ray 2 years ago showed mild arthritis.Has tried shoe insoles , PT not much help.No weakness in the legs.Right worse than left, pain worse with walking , climbing, activity.Quit his job because of the pain.    For insomnia was switched from Ambien to amitriptyline.States it is working well for him.Needs refills.    Current medicines (including changes today)  Current Outpatient Prescriptions   Medication Sig Dispense Refill   • diclofenac EC (VOLTAREN) 75 MG Tablet Delayed Response Take 1 Tab by mouth 2 times a day. 180 Tab 3   • amitriptyline (ELAVIL) 50 MG Tab TAKE 1 TAB BY MOUTH AT BEDTIME AS NEEDED. 90 Tab 1   • amitriptyline (ELAVIL) 50 MG Tab TAKE 1 TAB BY MOUTH AT BEDTIME AS NEEDED. 30 Tab 5   • Cholecalciferol (VITAMIN D) 2000 UNIT Tab Take  by mouth every day.       No current facility-administered medications for this visit.      He  has a past medical history of Disorders of iron metabolism; GERD (gastroesophageal reflux disease); Obesity, unspecified; Organic sleep apnea, unspecified; Other chronic nonalcoholic liver disease; and Reflux esophagitis.    ROS  No chest pain, no shortness of breath, no abdominal pain       Objective:     Blood pressure 146/92, pulse 93, temperature 36.5 °C (97.7 °F), height 1.651 m (5' 5\"), weight 113.9 kg (251 lb), SpO2 95 %. Body mass index is 41.77 kg/m².     PHYSICAL EXAM     GEN: Alert and oriented,well appearing, no acute distress  SKIN: warm, dry to touch, no rashes or lesions in visible areas  PSYCH: mood and affect normal, judgement normal  RESPIRATORY : Unlabored respiratory effort, no distress noted, clear to auscultation bilaterally, no wheeze, rhonchi, crackles  CARDIOVASCULAR: RRR, S1 S2 normal, no murmurs MUSCULOSKELETAL : Normal gait and stance, b/l knee : no tenderness to palpation, + mild crepitus, no redness or warmth, normal " ROM  NEURO: No overt focal neurologic deficits,sensation intact    RIGHT KNEE INJECTION PROCEDURE :    Procedure:The patient was apprised of the risks and the benefits of the procedure and consented. The patient’s right knee was cleaned with alcohol in a sterile fashion.1% xylocaine was used for local skin anesthesia.A medial approach was used.40 mg of kenalog was drawn up into a 5 mL syringe with 1% xylocaine. The patient was injected with a 1.5-inch 22-gauze needle at the anterolateral aspect of his right flexed knee. There were no complications. The patient tolerated the procedure well. There was minimal bleeding. The patient was instructed to ice his knee upon leaving clinic and refrain from overuse over the next 3 days. The patient was instructed to call or return to the clinic with any usual pain, swelling, or redness occurred in the injected area.      Assessment and Plan:   The following treatment plan was discussed    1. Primary osteoarthritis of both knees  Chronic.Uncontrolled pain.Right knee intrarticular steroid injection today as described above.Continue iciMichael pena refilled.Will obtain X rays.  - diclofenac EC (VOLTAREN) 75 MG Tablet Delayed Response; Take 1 Tab by mouth 2 times a day.  Dispense: 180 Tab; Refill: 3  - DX-KNEE COMPLETE 4+ LEFT; Future  - DX-KNEE COMPLETE 4+ RIGHT; Future    2. Primary insomnia  Chronic.Well controlled on amitriptyline.    3. Morbid obesity with BMI of 40.0-44.9, adult (HCC)  Chronic.Advised to start doing low intensity exercises including swimming, biking etc.Has been improving his diet.Monitor.Consider referral to [facility] in future.      Followup: Return in about 2 weeks (around 5/24/2018).         Please note that this dictation was created using voice recognition software. I have made every reasonable attempt to correct obvious errors, but I expect that there are errors of grammar and possibly content that I did not discover before finalizing the note.

## 2018-05-14 ENCOUNTER — HOSPITAL ENCOUNTER (OUTPATIENT)
Dept: RADIOLOGY | Facility: MEDICAL CENTER | Age: 58
End: 2018-05-14
Attending: FAMILY MEDICINE
Payer: COMMERCIAL

## 2018-05-14 DIAGNOSIS — M17.0 PRIMARY OSTEOARTHRITIS OF BOTH KNEES: ICD-10-CM

## 2018-05-14 PROCEDURE — 73564 X-RAY EXAM KNEE 4 OR MORE: CPT | Mod: RT

## 2018-05-14 PROCEDURE — 73564 X-RAY EXAM KNEE 4 OR MORE: CPT | Mod: LT

## 2018-05-15 ENCOUNTER — TELEPHONE (OUTPATIENT)
Dept: MEDICAL GROUP | Facility: PHYSICIAN GROUP | Age: 58
End: 2018-05-15

## 2018-05-15 NOTE — TELEPHONE ENCOUNTER
----- Message from Bam Shin M.D. sent at 5/14/2018  9:21 PM PDT -----  Please inform patient that both his knees have severe arthritis which has worsened over the last few years since his last X ray. Initial treatments include steroid injection like the one we did last visit, Physical Therapy and taking anti-inflammatory medications and icing.It is a progressive condition and will get even worse over time and ultimately may need knee replacement.One of the important things that will help is to lose weight through diet and exercise (biking, aquatics etc.).    Bam Shin M.D.

## 2018-05-15 NOTE — TELEPHONE ENCOUNTER
Phone Number Called: [phone] (home)     Message: Left message to call back    Left Message for patient to call back: yes

## 2018-05-23 ENCOUNTER — OFFICE VISIT (OUTPATIENT)
Dept: MEDICAL GROUP | Facility: PHYSICIAN GROUP | Age: 58
End: 2018-05-23
Payer: COMMERCIAL

## 2018-05-23 VITALS
DIASTOLIC BLOOD PRESSURE: 88 MMHG | HEART RATE: 82 BPM | SYSTOLIC BLOOD PRESSURE: 140 MMHG | TEMPERATURE: 97.5 F | WEIGHT: 255 LBS | BODY MASS INDEX: 42.49 KG/M2 | OXYGEN SATURATION: 95 % | HEIGHT: 65 IN

## 2018-05-23 DIAGNOSIS — M17.0 PRIMARY OSTEOARTHRITIS OF BOTH KNEES: ICD-10-CM

## 2018-05-23 PROCEDURE — 99213 OFFICE O/P EST LOW 20 MIN: CPT | Performed by: FAMILY MEDICINE

## 2018-05-23 ASSESSMENT — PAIN SCALES - GENERAL: PAINLEVEL: 7=MODERATE-SEVERE PAIN

## 2018-05-23 NOTE — PROGRESS NOTES
"Subjective:   Shawn Duffy is a 57 y.o. male here today for left knee steroid injection    HPI :     Patient with severe B/L knee arthritis that has progressed significantly from previous X ray.Recently had right right knee steroid injection, reports that it has helped somewhat but it is still painful.Has done PT, aquatic therapy, NSAIDs without much benefit.We will do a left knee joint  Kenalog injection today.    Current medicines (including changes today)  Current Outpatient Prescriptions   Medication Sig Dispense Refill   • diclofenac EC (VOLTAREN) 75 MG Tablet Delayed Response Take 1 Tab by mouth 2 times a day. 180 Tab 3   • amitriptyline (ELAVIL) 50 MG Tab TAKE 1 TAB BY MOUTH AT BEDTIME AS NEEDED. 90 Tab 1   • Cholecalciferol (VITAMIN D) 2000 UNIT Tab Take  by mouth every day.       No current facility-administered medications for this visit.      He  has a past medical history of Disorders of iron metabolism; GERD (gastroesophageal reflux disease); Obesity, unspecified; Organic sleep apnea, unspecified; Other chronic nonalcoholic liver disease; and Reflux esophagitis.    ROS   No chest pain, no shortness of breath, no abdominal pain       Objective:     Blood pressure 140/88, pulse 82, temperature 36.4 °C (97.5 °F), height 1.651 m (5' 5\"), weight 115.7 kg (255 lb), SpO2 95 %. Body mass index is 42.43 kg/m².     PHYSICAL EXAM     GEN: Alert and oriented,well appearing, no acute distress  SKIN: warm, dry to touch, no rashes or lesions in visible areas  PSYCH: mood and affect normal, judgement normal   MUSCULOSKELETAL : Normal gait and stance, b/l knee : no tenderness to palpation,normal ROM  NEURO: No overt focal neurologic deficits,sensation intact    LEFT KNEE INJECTION PROCEDURE :     Procedure:The patient was apprised of the risks and the benefits of the procedure and consented. The patient’s left knee was cleaned with alcohol in a sterile fashion.1% xylocaine was used for local skin anesthesia.A medial " approach was used.60 mg of kenalog was drawn up into a 5 mL syringe with 1% xylocaine. The patient was injected with a 1.5-inch 22-gauze needle at the anterolateral aspect of his right flexed knee. There were no complications. The patient tolerated the procedure well. There was minimal bleeding. The patient was instructed to ice his knee upon leaving clinic and refrain from overuse over the next 3 days. The patient was instructed to call or return to the clinic with any usual pain, swelling, or redness occurred in the injected area.    Assessment and Plan:   The following treatment plan was discussed    1. Primary osteoarthritis of both knees  Knee joint injection performed as described above.Follow up instructions given.Will also refer to Orthopedics for further evaluation and management  - REFERRAL TO ORTHOPEDICS      Followup: Return if symptoms worsen or fail to improve.         Please note that this dictation was created using voice recognition software. I have made every reasonable attempt to correct obvious errors, but I expect that there are errors of grammar and possibly content that I did not discover before finalizing the note.

## 2018-08-30 ENCOUNTER — OFFICE VISIT (OUTPATIENT)
Dept: MEDICAL GROUP | Facility: PHYSICIAN GROUP | Age: 58
End: 2018-08-30
Payer: COMMERCIAL

## 2018-08-30 VITALS
RESPIRATION RATE: 18 BRPM | WEIGHT: 270 LBS | DIASTOLIC BLOOD PRESSURE: 84 MMHG | HEART RATE: 101 BPM | SYSTOLIC BLOOD PRESSURE: 140 MMHG | OXYGEN SATURATION: 94 % | TEMPERATURE: 98.1 F | BODY MASS INDEX: 44.98 KG/M2 | HEIGHT: 65 IN

## 2018-08-30 DIAGNOSIS — F51.01 PRIMARY INSOMNIA: ICD-10-CM

## 2018-08-30 DIAGNOSIS — G47.33 OBSTRUCTIVE SLEEP APNEA SYNDROME: ICD-10-CM

## 2018-08-30 DIAGNOSIS — G89.29 CHRONIC RIGHT-SIDED LOW BACK PAIN WITHOUT SCIATICA: ICD-10-CM

## 2018-08-30 DIAGNOSIS — M54.50 CHRONIC RIGHT-SIDED LOW BACK PAIN WITHOUT SCIATICA: ICD-10-CM

## 2018-08-30 DIAGNOSIS — E66.01 MORBID OBESITY WITH BMI OF 40.0-44.9, ADULT (HCC): ICD-10-CM

## 2018-08-30 PROCEDURE — 99214 OFFICE O/P EST MOD 30 MIN: CPT | Performed by: NURSE PRACTITIONER

## 2018-08-30 RX ORDER — LIDOCAINE 50 MG/G
1 PATCH TOPICAL EVERY 24 HOURS
Qty: 10 PATCH | Refills: 3 | Status: SHIPPED | OUTPATIENT
Start: 2018-08-30 | End: 2019-01-18

## 2018-08-30 RX ORDER — ZOLPIDEM TARTRATE 5 MG/1
5 TABLET ORAL NIGHTLY PRN
Qty: 30 TAB | Refills: 0 | Status: SHIPPED
Start: 2018-08-30 | End: 2018-09-29
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
## 2018-08-30 NOTE — ASSESSMENT & PLAN NOTE
Patient reports that the amitryptilline is not helping him sleep.  Would like to re-try the ambien.  Discussed risks of this medication.  Discussed that his sleep may be hindered by the back pain.

## 2018-10-09 ENCOUNTER — NON-PROVIDER VISIT (OUTPATIENT)
Dept: MEDICAL GROUP | Facility: PHYSICIAN GROUP | Age: 58
End: 2018-10-09
Payer: COMMERCIAL

## 2018-10-09 DIAGNOSIS — F51.01 PRIMARY INSOMNIA: ICD-10-CM

## 2018-10-09 NOTE — PROGRESS NOTES
Shawn Duffy is a 58 y.o. male here for a non-provider visit for UDS for Ambien     If abnormal was an in office provider notified today (if so, indicate provider)? Yes  Routed to PCP? No

## 2019-01-18 ENCOUNTER — OFFICE VISIT (OUTPATIENT)
Dept: MEDICAL GROUP | Facility: PHYSICIAN GROUP | Age: 59
End: 2019-01-18
Payer: COMMERCIAL

## 2019-01-18 VITALS
RESPIRATION RATE: 18 BRPM | BODY MASS INDEX: 45.98 KG/M2 | HEART RATE: 85 BPM | DIASTOLIC BLOOD PRESSURE: 90 MMHG | SYSTOLIC BLOOD PRESSURE: 128 MMHG | HEIGHT: 65 IN | WEIGHT: 276 LBS | TEMPERATURE: 98.8 F | OXYGEN SATURATION: 95 %

## 2019-01-18 DIAGNOSIS — G47.33 OBSTRUCTIVE SLEEP APNEA SYNDROME: ICD-10-CM

## 2019-01-18 DIAGNOSIS — Z23 NEED FOR VACCINATION: ICD-10-CM

## 2019-01-18 DIAGNOSIS — E83.119 HEMOCHROMATOSIS, UNSPECIFIED HEMOCHROMATOSIS TYPE: ICD-10-CM

## 2019-01-18 DIAGNOSIS — M54.50 CHRONIC RIGHT-SIDED LOW BACK PAIN WITHOUT SCIATICA: ICD-10-CM

## 2019-01-18 DIAGNOSIS — F51.01 PRIMARY INSOMNIA: ICD-10-CM

## 2019-01-18 DIAGNOSIS — Z12.11 SCREENING FOR COLON CANCER: ICD-10-CM

## 2019-01-18 DIAGNOSIS — G89.29 CHRONIC RIGHT-SIDED LOW BACK PAIN WITHOUT SCIATICA: ICD-10-CM

## 2019-01-18 PROCEDURE — 90471 IMMUNIZATION ADMIN: CPT | Performed by: INTERNAL MEDICINE

## 2019-01-18 PROCEDURE — 99204 OFFICE O/P NEW MOD 45 MIN: CPT | Mod: 25 | Performed by: INTERNAL MEDICINE

## 2019-01-18 PROCEDURE — 90686 IIV4 VACC NO PRSV 0.5 ML IM: CPT | Performed by: INTERNAL MEDICINE

## 2019-01-18 RX ORDER — ZOLPIDEM TARTRATE 10 MG/1
TABLET ORAL
Qty: 15 TAB | Refills: 2 | Status: SHIPPED | OUTPATIENT
Start: 2019-01-18 | End: 2019-02-18

## 2019-01-18 RX ORDER — HYDROCODONE BITARTRATE AND ACETAMINOPHEN 5; 325 MG/1; MG/1
1 TABLET ORAL
Refills: 0 | COMMUNITY
Start: 2018-11-07 | End: 2018-12-11

## 2019-01-18 ASSESSMENT — PATIENT HEALTH QUESTIONNAIRE - PHQ9: CLINICAL INTERPRETATION OF PHQ2 SCORE: 0
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
## 2019-01-18 NOTE — ASSESSMENT & PLAN NOTE
Says Dr. Santacruz had him going to phlebotomy for this condition. Iron studies last checked 9/2017 by Dr. Shin and stopped phlebotomy due to normal results.

## 2019-01-18 NOTE — ASSESSMENT & PLAN NOTE
Amitriptyline doesn't work for him. He would like to resume ambien, which he was taking 5 mg a night. Says he's had insomnia for 8 years and has tried sleep hygiene.

## 2019-01-22 ENCOUNTER — HOSPITAL ENCOUNTER (OUTPATIENT)
Dept: LAB | Facility: MEDICAL CENTER | Age: 59
End: 2019-01-22
Attending: INTERNAL MEDICINE
Payer: COMMERCIAL

## 2019-01-22 DIAGNOSIS — G47.33 OBSTRUCTIVE SLEEP APNEA SYNDROME: ICD-10-CM

## 2019-01-22 DIAGNOSIS — E83.119 HEMOCHROMATOSIS, UNSPECIFIED HEMOCHROMATOSIS TYPE: ICD-10-CM

## 2019-01-22 LAB
25(OH)D3 SERPL-MCNC: 15 NG/ML (ref 30–100)
ALBUMIN SERPL BCP-MCNC: 4.2 G/DL (ref 3.2–4.9)
ALBUMIN/GLOB SERPL: 1.3 G/DL
ALP SERPL-CCNC: 79 U/L (ref 30–99)
ALT SERPL-CCNC: 58 U/L (ref 2–50)
ANION GAP SERPL CALC-SCNC: 10 MMOL/L (ref 0–11.9)
AST SERPL-CCNC: 38 U/L (ref 12–45)
BASOPHILS # BLD AUTO: 0.6 % (ref 0–1.8)
BASOPHILS # BLD: 0.05 K/UL (ref 0–0.12)
BILIRUB SERPL-MCNC: 1 MG/DL (ref 0.1–1.5)
BUN SERPL-MCNC: 13 MG/DL (ref 8–22)
CALCIUM SERPL-MCNC: 9.4 MG/DL (ref 8.5–10.5)
CHLORIDE SERPL-SCNC: 104 MMOL/L (ref 96–112)
CHOLEST SERPL-MCNC: 198 MG/DL (ref 100–199)
CO2 SERPL-SCNC: 26 MMOL/L (ref 20–33)
CREAT SERPL-MCNC: 0.75 MG/DL (ref 0.5–1.4)
EOSINOPHIL # BLD AUTO: 0.36 K/UL (ref 0–0.51)
EOSINOPHIL NFR BLD: 4.4 % (ref 0–6.9)
ERYTHROCYTE [DISTWIDTH] IN BLOOD BY AUTOMATED COUNT: 44.9 FL (ref 35.9–50)
FASTING STATUS PATIENT QL REPORTED: NORMAL
GLOBULIN SER CALC-MCNC: 3.3 G/DL (ref 1.9–3.5)
GLUCOSE SERPL-MCNC: 89 MG/DL (ref 65–99)
HCT VFR BLD AUTO: 46.8 % (ref 42–52)
HDLC SERPL-MCNC: 51 MG/DL
HGB BLD-MCNC: 15.8 G/DL (ref 14–18)
IMM GRANULOCYTES # BLD AUTO: 0.02 K/UL (ref 0–0.11)
IMM GRANULOCYTES NFR BLD AUTO: 0.2 % (ref 0–0.9)
IRON SATN MFR SERPL: 59 % (ref 15–55)
IRON SERPL-MCNC: 207 UG/DL (ref 50–180)
LDLC SERPL CALC-MCNC: 124 MG/DL
LYMPHOCYTES # BLD AUTO: 2.1 K/UL (ref 1–4.8)
LYMPHOCYTES NFR BLD: 25.8 % (ref 22–41)
MCH RBC QN AUTO: 33.3 PG (ref 27–33)
MCHC RBC AUTO-ENTMCNC: 33.8 G/DL (ref 33.7–35.3)
MCV RBC AUTO: 98.5 FL (ref 81.4–97.8)
MONOCYTES # BLD AUTO: 0.74 K/UL (ref 0–0.85)
MONOCYTES NFR BLD AUTO: 9.1 % (ref 0–13.4)
NEUTROPHILS # BLD AUTO: 4.88 K/UL (ref 1.82–7.42)
NEUTROPHILS NFR BLD: 59.9 % (ref 44–72)
NRBC # BLD AUTO: 0 K/UL
NRBC BLD-RTO: 0 /100 WBC
PLATELET # BLD AUTO: 230 K/UL (ref 164–446)
PMV BLD AUTO: 10.4 FL (ref 9–12.9)
POTASSIUM SERPL-SCNC: 4.2 MMOL/L (ref 3.6–5.5)
PROT SERPL-MCNC: 7.5 G/DL (ref 6–8.2)
RBC # BLD AUTO: 4.75 M/UL (ref 4.7–6.1)
SODIUM SERPL-SCNC: 140 MMOL/L (ref 135–145)
TIBC SERPL-MCNC: 353 UG/DL (ref 250–450)
TRIGL SERPL-MCNC: 116 MG/DL (ref 0–149)
WBC # BLD AUTO: 8.2 K/UL (ref 4.8–10.8)

## 2019-01-22 PROCEDURE — 82306 VITAMIN D 25 HYDROXY: CPT

## 2019-01-22 PROCEDURE — 85025 COMPLETE CBC W/AUTO DIFF WBC: CPT

## 2019-01-22 PROCEDURE — 83550 IRON BINDING TEST: CPT

## 2019-01-22 PROCEDURE — 80053 COMPREHEN METABOLIC PANEL: CPT

## 2019-01-22 PROCEDURE — 83540 ASSAY OF IRON: CPT

## 2019-01-22 PROCEDURE — 80061 LIPID PANEL: CPT

## 2019-01-22 PROCEDURE — 36415 COLL VENOUS BLD VENIPUNCTURE: CPT

## 2019-01-23 DIAGNOSIS — R79.89 LOW VITAMIN D LEVEL: ICD-10-CM

## 2019-01-23 RX ORDER — ERGOCALCIFEROL 1.25 MG/1
50000 CAPSULE ORAL
Qty: 12 CAP | Refills: 0 | Status: SHIPPED | OUTPATIENT
Start: 2019-01-23 | End: 2019-05-05 | Stop reason: SDUPTHER

## 2019-03-26 RX ORDER — AMITRIPTYLINE HYDROCHLORIDE 50 MG/1
50 TABLET, FILM COATED ORAL NIGHTLY PRN
Qty: 90 TAB | Refills: 0 | Status: SHIPPED | OUTPATIENT
Start: 2019-03-26 | End: 2019-04-18 | Stop reason: SDUPTHER

## 2019-03-26 NOTE — TELEPHONE ENCOUNTER
Was the patient seen in the last year in this department? Yes    Does patient have an active prescription for medications requested? No     Received Request Via: Pharmacy      Pt met protocol?: Yes, OV 1/19, d/c'd 1/19 (prescription complete)

## 2019-04-09 DIAGNOSIS — R79.89 LOW VITAMIN D LEVEL: ICD-10-CM

## 2019-04-09 NOTE — TELEPHONE ENCOUNTER
Was the patient seen in the last year in this department? Yes    Does patient have an active prescription for medications requested? No     Received Request Via: Pharmacy      Pt met protocol?: Yes, last ov 1/19  Last vitamin d lab 1/22/19    25-Hydroxy   Vitamin D 25 15   30 - 100 ng/mL Final

## 2019-04-11 RX ORDER — ERGOCALCIFEROL 1.25 MG/1
CAPSULE ORAL
Qty: 12 CAP | Refills: 0 | OUTPATIENT
Start: 2019-04-11

## 2019-04-11 NOTE — TELEPHONE ENCOUNTER
This was my result note for his vitamin D:     Otherwise, his vitamin D was very low for which I've given him a weekly prescription dose to take for 3 months and then have his vitamin D level checked after

## 2019-04-19 RX ORDER — AMITRIPTYLINE HYDROCHLORIDE 50 MG/1
TABLET, FILM COATED ORAL
Qty: 90 TAB | Refills: 0 | Status: SHIPPED | OUTPATIENT
Start: 2019-04-19 | End: 2019-09-20 | Stop reason: SDUPTHER

## 2019-04-19 NOTE — TELEPHONE ENCOUNTER
Was the patient seen in the last year in this department? Yes    Does patient have an active prescription for medications requested? Yes    Received Request Via: Pharmacy      Pt met protocol?: Yes    OV 1/19

## 2019-04-20 DIAGNOSIS — M17.0 PRIMARY OSTEOARTHRITIS OF BOTH KNEES: ICD-10-CM

## 2019-04-22 RX ORDER — DICLOFENAC SODIUM 75 MG/1
75 TABLET, DELAYED RELEASE ORAL 2 TIMES DAILY
Qty: 180 TAB | Refills: 3 | Status: SHIPPED | OUTPATIENT
Start: 2019-04-22 | End: 2019-11-03

## 2019-04-22 RX ORDER — DICLOFENAC SODIUM 75 MG/1
TABLET, DELAYED RELEASE ORAL
Refills: 3 | OUTPATIENT
Start: 2019-04-22

## 2019-05-05 DIAGNOSIS — R79.89 LOW VITAMIN D LEVEL: ICD-10-CM

## 2019-05-06 NOTE — TELEPHONE ENCOUNTER
Was the patient seen in the last year in this department? Yes    Does patient have an active prescription for medications requested? No     Received Request Via: Pharmacy      Pt met protocol?: Yes, OV 1/19, labs done 1/19 (LOW)

## 2019-05-07 RX ORDER — ERGOCALCIFEROL 1.25 MG/1
CAPSULE ORAL
Qty: 12 CAP | Refills: 0 | Status: SHIPPED | OUTPATIENT
Start: 2019-05-07 | End: 2019-11-03

## 2019-06-04 ENCOUNTER — OFFICE VISIT (OUTPATIENT)
Dept: MEDICAL GROUP | Facility: PHYSICIAN GROUP | Age: 59
End: 2019-06-04
Payer: COMMERCIAL

## 2019-06-04 ENCOUNTER — HOSPITAL ENCOUNTER (OUTPATIENT)
Dept: LAB | Facility: MEDICAL CENTER | Age: 59
End: 2019-06-04
Attending: INTERNAL MEDICINE
Payer: COMMERCIAL

## 2019-06-04 VITALS
HEART RATE: 80 BPM | RESPIRATION RATE: 18 BRPM | WEIGHT: 279 LBS | DIASTOLIC BLOOD PRESSURE: 88 MMHG | HEIGHT: 65 IN | SYSTOLIC BLOOD PRESSURE: 132 MMHG | TEMPERATURE: 98.4 F | BODY MASS INDEX: 46.48 KG/M2 | OXYGEN SATURATION: 96 %

## 2019-06-04 DIAGNOSIS — F51.01 PRIMARY INSOMNIA: ICD-10-CM

## 2019-06-04 DIAGNOSIS — E66.01 MORBID OBESITY WITH BMI OF 45.0-49.9, ADULT (HCC): ICD-10-CM

## 2019-06-04 DIAGNOSIS — M54.50 CHRONIC RIGHT-SIDED LOW BACK PAIN WITHOUT SCIATICA: ICD-10-CM

## 2019-06-04 DIAGNOSIS — G89.29 CHRONIC RIGHT-SIDED LOW BACK PAIN WITHOUT SCIATICA: ICD-10-CM

## 2019-06-04 DIAGNOSIS — M17.0 PRIMARY OSTEOARTHRITIS OF BOTH KNEES: ICD-10-CM

## 2019-06-04 DIAGNOSIS — R79.89 LOW VITAMIN D LEVEL: ICD-10-CM

## 2019-06-04 LAB — 25(OH)D3 SERPL-MCNC: 26 NG/ML (ref 30–100)

## 2019-06-04 PROCEDURE — 82306 VITAMIN D 25 HYDROXY: CPT

## 2019-06-04 PROCEDURE — 36415 COLL VENOUS BLD VENIPUNCTURE: CPT

## 2019-06-04 PROCEDURE — 99214 OFFICE O/P EST MOD 30 MIN: CPT | Performed by: INTERNAL MEDICINE

## 2019-06-04 RX ORDER — SODIUM FLUORIDE 1.1 G/100G
GEL ORAL
COMMUNITY
Start: 2019-05-21 | End: 2019-11-03

## 2019-06-04 RX ORDER — ZOLPIDEM TARTRATE 10 MG/1
10 TABLET ORAL NIGHTLY PRN
Qty: 15 TAB | Refills: 0 | Status: SHIPPED | OUTPATIENT
Start: 2019-06-04 | End: 2019-07-04

## 2019-06-04 RX ORDER — HYDROCODONE BITARTRATE AND ACETAMINOPHEN 5; 325 MG/1; MG/1
TABLET ORAL
Refills: 0 | COMMUNITY
Start: 2019-03-29 | End: 2019-11-03

## 2019-06-04 NOTE — ASSESSMENT & PLAN NOTE
Following with [facility] and is working on scheduling his surgery with Dr. Ferrera once he is financially able. Wakes up with pain in the mornings and manages with advil but sometimes he takes norco throughout the day if needed.
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
## 2019-06-04 NOTE — ASSESSMENT & PLAN NOTE
He will be following up with [facility] orthopaedics later today. He was told he has something torn in his knee and also a cyst. His follow up appointment is later today with the orthopaedic group.

## 2019-08-07 DIAGNOSIS — R79.89 LOW VITAMIN D LEVEL: ICD-10-CM

## 2019-08-08 NOTE — TELEPHONE ENCOUNTER
Was the patient seen in the last year in this department? Yes    Does patient have an active prescription for medications requested? No     Received Request Via: Pharmacy      Pt met protocol?: Yes, last ov 6/19  Last labs 6/19     25-Hydroxy   Vitamin D 25 30 - 100 ng/mL 26Low

## 2019-08-09 RX ORDER — ERGOCALCIFEROL 1.25 MG/1
CAPSULE ORAL
Refills: 0 | OUTPATIENT
Start: 2019-08-09

## 2019-09-20 RX ORDER — AMITRIPTYLINE HYDROCHLORIDE 50 MG/1
TABLET, FILM COATED ORAL
Qty: 90 TAB | Refills: 1 | Status: SHIPPED | OUTPATIENT
Start: 2019-09-20 | End: 2020-02-18

## 2019-10-28 ENCOUNTER — OFFICE VISIT (OUTPATIENT)
Dept: URGENT CARE | Facility: PHYSICIAN GROUP | Age: 59
End: 2019-10-28
Payer: COMMERCIAL

## 2019-10-28 VITALS
HEIGHT: 65 IN | DIASTOLIC BLOOD PRESSURE: 90 MMHG | SYSTOLIC BLOOD PRESSURE: 150 MMHG | RESPIRATION RATE: 20 BRPM | WEIGHT: 260 LBS | OXYGEN SATURATION: 94 % | HEART RATE: 128 BPM | BODY MASS INDEX: 43.32 KG/M2 | TEMPERATURE: 98.8 F

## 2019-10-28 DIAGNOSIS — M10.9 ACUTE GOUT OF MULTIPLE SITES, UNSPECIFIED CAUSE: ICD-10-CM

## 2019-10-28 PROCEDURE — 99214 OFFICE O/P EST MOD 30 MIN: CPT | Performed by: NURSE PRACTITIONER

## 2019-10-28 RX ORDER — PREDNISONE 10 MG/1
TABLET ORAL
Qty: 30 TAB | Refills: 0 | Status: SHIPPED | OUTPATIENT
Start: 2019-10-28 | End: 2019-11-02

## 2019-10-28 ASSESSMENT — PAIN SCALES - GENERAL: PAINLEVEL: 9=SEVERE PAIN

## 2019-10-28 NOTE — LETTER
October 28, 2019         Patient: Shawn Duffy   YOB: 1960   Date of Visit: 10/28/2019           To Whom it May Concern:    Shawn Duffy was seen in my clinic on 10/28/2019. He may return to work on 10/30/2019.    If you have any questions or concerns, please don't hesitate to call.        Sincerely,           MAYNOR Wade.  Electronically Signed

## 2019-10-29 ENCOUNTER — HOSPITAL ENCOUNTER (OUTPATIENT)
Dept: LAB | Facility: MEDICAL CENTER | Age: 59
End: 2019-10-29
Attending: NURSE PRACTITIONER
Payer: COMMERCIAL

## 2019-10-29 DIAGNOSIS — M10.9 ACUTE GOUT OF MULTIPLE SITES, UNSPECIFIED CAUSE: ICD-10-CM

## 2019-10-29 LAB
BASOPHILS # BLD AUTO: 0.4 % (ref 0–1.8)
BASOPHILS # BLD: 0.04 K/UL (ref 0–0.12)
EOSINOPHIL # BLD AUTO: 0.06 K/UL (ref 0–0.51)
EOSINOPHIL NFR BLD: 0.6 % (ref 0–6.9)
ERYTHROCYTE [DISTWIDTH] IN BLOOD BY AUTOMATED COUNT: 43 FL (ref 35.9–50)
HCT VFR BLD AUTO: 46 % (ref 42–52)
HGB BLD-MCNC: 15.4 G/DL (ref 14–18)
IMM GRANULOCYTES # BLD AUTO: 0.03 K/UL (ref 0–0.11)
IMM GRANULOCYTES NFR BLD AUTO: 0.3 % (ref 0–0.9)
LYMPHOCYTES # BLD AUTO: 1.6 K/UL (ref 1–4.8)
LYMPHOCYTES NFR BLD: 15 % (ref 22–41)
MCH RBC QN AUTO: 33 PG (ref 27–33)
MCHC RBC AUTO-ENTMCNC: 33.5 G/DL (ref 33.7–35.3)
MCV RBC AUTO: 98.7 FL (ref 81.4–97.8)
MONOCYTES # BLD AUTO: 0.95 K/UL (ref 0–0.85)
MONOCYTES NFR BLD AUTO: 8.9 % (ref 0–13.4)
NEUTROPHILS # BLD AUTO: 7.98 K/UL (ref 1.82–7.42)
NEUTROPHILS NFR BLD: 74.8 % (ref 44–72)
NRBC # BLD AUTO: 0 K/UL
NRBC BLD-RTO: 0 /100 WBC
PLATELET # BLD AUTO: 250 K/UL (ref 164–446)
PMV BLD AUTO: 10.7 FL (ref 9–12.9)
RBC # BLD AUTO: 4.66 M/UL (ref 4.7–6.1)
URATE SERPL-MCNC: 6.9 MG/DL (ref 2.5–8.3)
WBC # BLD AUTO: 10.7 K/UL (ref 4.8–10.8)

## 2019-10-29 PROCEDURE — 84550 ASSAY OF BLOOD/URIC ACID: CPT

## 2019-10-29 PROCEDURE — 85025 COMPLETE CBC W/AUTO DIFF WBC: CPT

## 2019-10-29 PROCEDURE — 36415 COLL VENOUS BLD VENIPUNCTURE: CPT

## 2019-10-29 ASSESSMENT — ENCOUNTER SYMPTOMS
PALPITATIONS: 0
FATIGUE: 0
DIZZINESS: 0
WHEEZING: 0
CHILLS: 0
SHORTNESS OF BREATH: 0
SENSORY CHANGE: 0
JOINT SWELLING: 1
HEADACHES: 0
FEVER: 0
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
## 2019-10-30 ENCOUNTER — TELEPHONE (OUTPATIENT)
Dept: URGENT CARE | Facility: PHYSICIAN GROUP | Age: 59
End: 2019-10-30

## 2019-10-30 NOTE — TELEPHONE ENCOUNTER
Spoke with patient regarding results. Patient states that he is feeling better and is no longer using crutches. States that his swelling is gone down significantly.   Instructed patient to return to clinic for worsening symptoms or signs and symptoms of infection.     Patient expresses understanding and agrees to plan

## 2019-11-01 ENCOUNTER — HOSPITAL ENCOUNTER (OUTPATIENT)
Dept: HOSPITAL 8 - STAR | Age: 59
Discharge: HOME | End: 2019-11-01
Attending: ORTHOPAEDIC SURGERY
Payer: COMMERCIAL

## 2019-11-01 DIAGNOSIS — Z84.89: ICD-10-CM

## 2019-11-01 DIAGNOSIS — M17.12: ICD-10-CM

## 2019-11-01 DIAGNOSIS — I25.2: ICD-10-CM

## 2019-11-01 DIAGNOSIS — Z01.818: Primary | ICD-10-CM

## 2019-11-01 LAB
ANION GAP SERPL CALC-SCNC: 6 MMOL/L (ref 5–15)
BASOPHILS # BLD AUTO: 0.04 X10^3/UL (ref 0–0.1)
BASOPHILS NFR BLD AUTO: 0 % (ref 0–1)
CALCIUM SERPL-MCNC: 8.9 MG/DL (ref 8.5–10.1)
CHLORIDE SERPL-SCNC: 111 MMOL/L (ref 98–107)
CREAT SERPL-MCNC: 1.04 MG/DL (ref 0.7–1.3)
EOSINOPHIL # BLD AUTO: 0 X10^3/UL (ref 0–0.4)
EOSINOPHIL NFR BLD AUTO: 0 % (ref 1–7)
ERYTHROCYTE [DISTWIDTH] IN BLOOD BY AUTOMATED COUNT: 12.4 % (ref 9.4–14.8)
LYMPHOCYTES # BLD AUTO: 1.18 X10^3/UL (ref 1–3.4)
LYMPHOCYTES NFR BLD AUTO: 11 % (ref 22–44)
MCH RBC QN AUTO: 33.7 PG (ref 27.5–34.5)
MCHC RBC AUTO-ENTMCNC: 33.9 G/DL (ref 33.2–36.2)
MCV RBC AUTO: 99.3 FL (ref 81–97)
MD: NO
MONOCYTES # BLD AUTO: 0.27 X10^3/UL (ref 0.2–0.8)
MONOCYTES NFR BLD AUTO: 2 % (ref 2–9)
NEUTROPHILS # BLD AUTO: 9.66 X10^3/UL (ref 1.8–6.8)
NEUTROPHILS NFR BLD AUTO: 87 % (ref 42–75)
PLATELET # BLD AUTO: 317 X10^3/UL (ref 130–400)
PMV BLD AUTO: 8.4 FL (ref 7.4–10.4)
RBC # BLD AUTO: 4.58 X10^6/UL (ref 4.38–5.82)

## 2019-11-01 PROCEDURE — 36415 COLL VENOUS BLD VENIPUNCTURE: CPT

## 2019-11-01 PROCEDURE — 85025 COMPLETE CBC W/AUTO DIFF WBC: CPT

## 2019-11-01 PROCEDURE — 87081 CULTURE SCREEN ONLY: CPT

## 2019-11-01 PROCEDURE — 93005 ELECTROCARDIOGRAM TRACING: CPT

## 2019-11-01 PROCEDURE — 80048 BASIC METABOLIC PNL TOTAL CA: CPT

## 2019-11-03 ENCOUNTER — HOSPITAL ENCOUNTER (INPATIENT)
Facility: MEDICAL CENTER | Age: 59
LOS: 2 days | DRG: 068 | End: 2019-11-05
Attending: EMERGENCY MEDICINE | Admitting: INTERNAL MEDICINE
Payer: COMMERCIAL

## 2019-11-03 DIAGNOSIS — R29.90 STROKE-LIKE SYMPTOM: ICD-10-CM

## 2019-11-03 PROCEDURE — 99223 1ST HOSP IP/OBS HIGH 75: CPT | Performed by: INTERNAL MEDICINE

## 2019-11-03 PROCEDURE — 80053 COMPREHEN METABOLIC PANEL: CPT

## 2019-11-03 PROCEDURE — 83036 HEMOGLOBIN GLYCOSYLATED A1C: CPT

## 2019-11-03 PROCEDURE — 85025 COMPLETE CBC W/AUTO DIFF WBC: CPT

## 2019-11-03 PROCEDURE — 99285 EMERGENCY DEPT VISIT HI MDM: CPT

## 2019-11-03 PROCEDURE — 85610 PROTHROMBIN TIME: CPT

## 2019-11-03 PROCEDURE — 770020 HCHG ROOM/CARE - TELE (206)

## 2019-11-03 PROCEDURE — 83735 ASSAY OF MAGNESIUM: CPT

## 2019-11-03 RX ORDER — DICLOFENAC SODIUM 75 MG/1
75 TABLET, DELAYED RELEASE ORAL DAILY
Status: ON HOLD | COMMUNITY
End: 2019-11-05

## 2019-11-04 ENCOUNTER — APPOINTMENT (OUTPATIENT)
Dept: RADIOLOGY | Facility: MEDICAL CENTER | Age: 59
DRG: 068 | End: 2019-11-04
Attending: PSYCHIATRY & NEUROLOGY
Payer: COMMERCIAL

## 2019-11-04 ENCOUNTER — APPOINTMENT (OUTPATIENT)
Dept: RADIOLOGY | Facility: MEDICAL CENTER | Age: 59
DRG: 068 | End: 2019-11-04
Attending: INTERNAL MEDICINE
Payer: COMMERCIAL

## 2019-11-04 ENCOUNTER — APPOINTMENT (OUTPATIENT)
Dept: CARDIOLOGY | Facility: MEDICAL CENTER | Age: 59
DRG: 068 | End: 2019-11-04
Attending: INTERNAL MEDICINE
Payer: COMMERCIAL

## 2019-11-04 ENCOUNTER — HOSPITAL ENCOUNTER (OUTPATIENT)
Dept: RADIOLOGY | Facility: MEDICAL CENTER | Age: 59
End: 2019-11-04

## 2019-11-04 ENCOUNTER — APPOINTMENT (OUTPATIENT)
Dept: RADIOLOGY | Facility: MEDICAL CENTER | Age: 59
DRG: 068 | End: 2019-11-04
Attending: EMERGENCY MEDICINE
Payer: COMMERCIAL

## 2019-11-04 PROBLEM — I63.9 STROKE (HCC): Status: ACTIVE | Noted: 2019-11-04

## 2019-11-04 PROBLEM — I65.1 BASILAR ARTERY OCCLUSION: Status: ACTIVE | Noted: 2019-11-04

## 2019-11-04 PROBLEM — I10 HYPERTENSION: Status: ACTIVE | Noted: 2019-11-04

## 2019-11-04 LAB
ALBUMIN SERPL BCP-MCNC: 3.8 G/DL (ref 3.2–4.9)
ALBUMIN/GLOB SERPL: 1.2 G/DL
ALP SERPL-CCNC: 88 U/L (ref 30–99)
ALT SERPL-CCNC: 62 U/L (ref 2–50)
ANION GAP SERPL CALC-SCNC: 9 MMOL/L (ref 0–11.9)
AST SERPL-CCNC: 34 U/L (ref 12–45)
BASOPHILS # BLD AUTO: 0.4 % (ref 0–1.8)
BASOPHILS # BLD: 0.06 K/UL (ref 0–0.12)
BILIRUB SERPL-MCNC: 0.7 MG/DL (ref 0.1–1.5)
BUN SERPL-MCNC: 19 MG/DL (ref 8–22)
CALCIUM SERPL-MCNC: 8.7 MG/DL (ref 8.5–10.5)
CHLORIDE SERPL-SCNC: 105 MMOL/L (ref 96–112)
CO2 SERPL-SCNC: 23 MMOL/L (ref 20–33)
CREAT SERPL-MCNC: 0.83 MG/DL (ref 0.5–1.4)
EOSINOPHIL # BLD AUTO: 0.21 K/UL (ref 0–0.51)
EOSINOPHIL NFR BLD: 1.5 % (ref 0–6.9)
ERYTHROCYTE [DISTWIDTH] IN BLOOD BY AUTOMATED COUNT: 43.1 FL (ref 35.9–50)
EST. AVERAGE GLUCOSE BLD GHB EST-MCNC: 117 MG/DL
GLOBULIN SER CALC-MCNC: 3.3 G/DL (ref 1.9–3.5)
GLUCOSE SERPL-MCNC: 96 MG/DL (ref 65–99)
HBA1C MFR BLD: 5.7 % (ref 0–5.6)
HCT VFR BLD AUTO: 43.9 % (ref 42–52)
HGB BLD-MCNC: 14.9 G/DL (ref 14–18)
IMM GRANULOCYTES # BLD AUTO: 0.07 K/UL (ref 0–0.11)
IMM GRANULOCYTES NFR BLD AUTO: 0.5 % (ref 0–0.9)
INR PPP: 0.97 (ref 0.87–1.13)
LV EJECT FRACT  99904: 65
LV EJECT FRACT MOD 2C 99903: 55.08
LV EJECT FRACT MOD 4C 99902: 58.1
LV EJECT FRACT MOD BP 99901: 57.4
LYMPHOCYTES # BLD AUTO: 3.03 K/UL (ref 1–4.8)
LYMPHOCYTES NFR BLD: 21 % (ref 22–41)
MAGNESIUM SERPL-MCNC: 2 MG/DL (ref 1.5–2.5)
MCH RBC QN AUTO: 33 PG (ref 27–33)
MCHC RBC AUTO-ENTMCNC: 33.9 G/DL (ref 33.7–35.3)
MCV RBC AUTO: 97.1 FL (ref 81.4–97.8)
MONOCYTES # BLD AUTO: 0.85 K/UL (ref 0–0.85)
MONOCYTES NFR BLD AUTO: 5.9 % (ref 0–13.4)
NEUTROPHILS # BLD AUTO: 10.18 K/UL (ref 1.82–7.42)
NEUTROPHILS NFR BLD: 70.7 % (ref 44–72)
NRBC # BLD AUTO: 0 K/UL
NRBC BLD-RTO: 0 /100 WBC
PLATELET # BLD AUTO: 264 K/UL (ref 164–446)
PMV BLD AUTO: 9.8 FL (ref 9–12.9)
POTASSIUM SERPL-SCNC: 3.5 MMOL/L (ref 3.6–5.5)
PROT SERPL-MCNC: 7.1 G/DL (ref 6–8.2)
PROTHROMBIN TIME: 13.1 SEC (ref 12–14.6)
RBC # BLD AUTO: 4.52 M/UL (ref 4.7–6.1)
SODIUM SERPL-SCNC: 137 MMOL/L (ref 135–145)
WBC # BLD AUTO: 14.4 K/UL (ref 4.8–10.8)

## 2019-11-04 PROCEDURE — 70544 MR ANGIOGRAPHY HEAD W/O DYE: CPT

## 2019-11-04 PROCEDURE — 700105 HCHG RX REV CODE 258: Performed by: INTERNAL MEDICINE

## 2019-11-04 PROCEDURE — 99232 SBSQ HOSP IP/OBS MODERATE 35: CPT | Performed by: HOSPITALIST

## 2019-11-04 PROCEDURE — 70551 MRI BRAIN STEM W/O DYE: CPT

## 2019-11-04 PROCEDURE — 700102 HCHG RX REV CODE 250 W/ 637 OVERRIDE(OP): Performed by: INTERNAL MEDICINE

## 2019-11-04 PROCEDURE — 97165 OT EVAL LOW COMPLEX 30 MIN: CPT

## 2019-11-04 PROCEDURE — A9270 NON-COVERED ITEM OR SERVICE: HCPCS | Performed by: PSYCHIATRY & NEUROLOGY

## 2019-11-04 PROCEDURE — 700111 HCHG RX REV CODE 636 W/ 250 OVERRIDE (IP): Performed by: INTERNAL MEDICINE

## 2019-11-04 PROCEDURE — 770020 HCHG ROOM/CARE - TELE (206)

## 2019-11-04 PROCEDURE — 700102 HCHG RX REV CODE 250 W/ 637 OVERRIDE(OP): Performed by: PSYCHIATRY & NEUROLOGY

## 2019-11-04 PROCEDURE — 93306 TTE W/DOPPLER COMPLETE: CPT

## 2019-11-04 PROCEDURE — 70547 MR ANGIOGRAPHY NECK W/O DYE: CPT

## 2019-11-04 PROCEDURE — 93306 TTE W/DOPPLER COMPLETE: CPT | Mod: 26 | Performed by: INTERNAL MEDICINE

## 2019-11-04 PROCEDURE — A9270 NON-COVERED ITEM OR SERVICE: HCPCS | Performed by: INTERNAL MEDICINE

## 2019-11-04 PROCEDURE — 99285 EMERGENCY DEPT VISIT HI MDM: CPT

## 2019-11-04 PROCEDURE — 97161 PT EVAL LOW COMPLEX 20 MIN: CPT

## 2019-11-04 PROCEDURE — 94760 N-INVAS EAR/PLS OXIMETRY 1: CPT

## 2019-11-04 RX ORDER — HYDRALAZINE HYDROCHLORIDE 20 MG/ML
10 INJECTION INTRAMUSCULAR; INTRAVENOUS
Status: DISCONTINUED | OUTPATIENT
Start: 2019-11-04 | End: 2019-11-05 | Stop reason: HOSPADM

## 2019-11-04 RX ORDER — ASPIRIN 81 MG/1
324 TABLET, CHEWABLE ORAL DAILY
Status: DISCONTINUED | OUTPATIENT
Start: 2019-11-04 | End: 2019-11-04

## 2019-11-04 RX ORDER — CLOPIDOGREL BISULFATE 75 MG/1
75 TABLET ORAL DAILY
Status: DISCONTINUED | OUTPATIENT
Start: 2019-11-04 | End: 2019-11-04

## 2019-11-04 RX ORDER — ATORVASTATIN CALCIUM 80 MG/1
80 TABLET, FILM COATED ORAL EVERY EVENING
Status: DISCONTINUED | OUTPATIENT
Start: 2019-11-04 | End: 2019-11-05 | Stop reason: HOSPADM

## 2019-11-04 RX ORDER — ASPIRIN 81 MG/1
81 TABLET, CHEWABLE ORAL DAILY
Status: DISCONTINUED | OUTPATIENT
Start: 2019-11-04 | End: 2019-11-05 | Stop reason: HOSPADM

## 2019-11-04 RX ORDER — ASPIRIN 325 MG
325 TABLET ORAL DAILY
Status: DISCONTINUED | OUTPATIENT
Start: 2019-11-04 | End: 2019-11-04

## 2019-11-04 RX ORDER — CLOPIDOGREL BISULFATE 75 MG/1
75 TABLET ORAL DAILY
Status: DISCONTINUED | OUTPATIENT
Start: 2019-11-05 | End: 2019-11-05 | Stop reason: HOSPADM

## 2019-11-04 RX ORDER — LABETALOL HYDROCHLORIDE 5 MG/ML
10 INJECTION, SOLUTION INTRAVENOUS EVERY 4 HOURS PRN
Status: DISCONTINUED | OUTPATIENT
Start: 2019-11-04 | End: 2019-11-05 | Stop reason: HOSPADM

## 2019-11-04 RX ORDER — ACETAMINOPHEN 325 MG/1
650 TABLET ORAL EVERY 6 HOURS PRN
Status: DISCONTINUED | OUTPATIENT
Start: 2019-11-04 | End: 2019-11-05 | Stop reason: HOSPADM

## 2019-11-04 RX ORDER — SODIUM CHLORIDE 9 MG/ML
INJECTION, SOLUTION INTRAVENOUS CONTINUOUS
Status: DISCONTINUED | OUTPATIENT
Start: 2019-11-04 | End: 2019-11-04

## 2019-11-04 RX ORDER — AMOXICILLIN 250 MG
2 CAPSULE ORAL 2 TIMES DAILY
Status: DISCONTINUED | OUTPATIENT
Start: 2019-11-04 | End: 2019-11-05 | Stop reason: HOSPADM

## 2019-11-04 RX ORDER — CLOPIDOGREL BISULFATE 75 MG/1
300 TABLET ORAL ONCE
Status: COMPLETED | OUTPATIENT
Start: 2019-11-04 | End: 2019-11-04

## 2019-11-04 RX ORDER — ASPIRIN 300 MG/1
300 SUPPOSITORY RECTAL DAILY
Status: DISCONTINUED | OUTPATIENT
Start: 2019-11-04 | End: 2019-11-04

## 2019-11-04 RX ORDER — BISACODYL 10 MG
10 SUPPOSITORY, RECTAL RECTAL
Status: DISCONTINUED | OUTPATIENT
Start: 2019-11-04 | End: 2019-11-05 | Stop reason: HOSPADM

## 2019-11-04 RX ORDER — POLYETHYLENE GLYCOL 3350 17 G/17G
1 POWDER, FOR SOLUTION ORAL
Status: DISCONTINUED | OUTPATIENT
Start: 2019-11-04 | End: 2019-11-05 | Stop reason: HOSPADM

## 2019-11-04 RX ADMIN — ENOXAPARIN SODIUM 40 MG: 100 INJECTION SUBCUTANEOUS at 10:08

## 2019-11-04 RX ADMIN — SODIUM CHLORIDE: 9 INJECTION, SOLUTION INTRAVENOUS at 03:14

## 2019-11-04 RX ADMIN — ATORVASTATIN CALCIUM 80 MG: 80 TABLET, FILM COATED ORAL at 16:34

## 2019-11-04 RX ADMIN — ASPIRIN 81 MG 81 MG: 81 TABLET ORAL at 05:25

## 2019-11-04 RX ADMIN — CLOPIDOGREL BISULFATE 300 MG: 75 TABLET ORAL at 05:25

## 2019-11-04 ASSESSMENT — GAIT ASSESSMENTS
GAIT LEVEL OF ASSIST: SUPERVISED
DISTANCE (FEET): 150
DEVIATION: ANTALGIC;STEP TO

## 2019-11-04 ASSESSMENT — COGNITIVE AND FUNCTIONAL STATUS - GENERAL
CLIMB 3 TO 5 STEPS WITH RAILING: A LITTLE
SUGGESTED CMS G CODE MODIFIER DAILY ACTIVITY: CH
MOBILITY SCORE: 22
WALKING IN HOSPITAL ROOM: A LITTLE
MOBILITY SCORE: 24
DAILY ACTIVITIY SCORE: 24
SUGGESTED CMS G CODE MODIFIER MOBILITY: CH
SUGGESTED CMS G CODE MODIFIER DAILY ACTIVITY: CH
DAILY ACTIVITIY SCORE: 24
SUGGESTED CMS G CODE MODIFIER MOBILITY: CJ

## 2019-11-04 ASSESSMENT — LIFESTYLE VARIABLES
EVER_SMOKED: NEVER
HAVE YOU EVER FELT YOU SHOULD CUT DOWN ON YOUR DRINKING: NO
ALCOHOL_USE: NO
ALCOHOL_USE: NO
AVERAGE NUMBER OF DAYS PER WEEK YOU HAVE A DRINK CONTAINING ALCOHOL: 0
TOTAL SCORE: 0
EVER_SMOKED: NEVER
HOW MANY TIMES IN THE PAST YEAR HAVE YOU HAD 5 OR MORE DRINKS IN A DAY: 0
CONSUMPTION TOTAL: NEGATIVE
TOTAL SCORE: 0
EVER HAD A DRINK FIRST THING IN THE MORNING TO STEADY YOUR NERVES TO GET RID OF A HANGOVER: NO
TOTAL SCORE: 0
ON A TYPICAL DAY WHEN YOU DRINK ALCOHOL HOW MANY DRINKS DO YOU HAVE: 0
HAVE PEOPLE ANNOYED YOU BY CRITICIZING YOUR DRINKING: NO
EVER FELT BAD OR GUILTY ABOUT YOUR DRINKING: NO

## 2019-11-04 ASSESSMENT — ENCOUNTER SYMPTOMS
COUGH: 0
SENSORY CHANGE: 1
SHORTNESS OF BREATH: 0
PALPITATIONS: 0
MYALGIAS: 0
SEIZURES: 0
HEADACHES: 0
SPUTUM PRODUCTION: 0
BRUISES/BLEEDS EASILY: 0
SORE THROAT: 0
BLOOD IN STOOL: 0
CHILLS: 0
ABDOMINAL PAIN: 0
WHEEZING: 0
VOMITING: 0
BLURRED VISION: 0
NAUSEA: 0
FEVER: 0
NECK PAIN: 0
FOCAL WEAKNESS: 0
BACK PAIN: 0
DIZZINESS: 0
DIAPHORESIS: 0
DIARRHEA: 0
FLANK PAIN: 0

## 2019-11-04 ASSESSMENT — COPD QUESTIONNAIRES
DO YOU EVER COUGH UP ANY MUCUS OR PHLEGM?: NO/ONLY WITH OCCASIONAL COLDS OR INFECTIONS
COPD SCREENING SCORE: 2
HAVE YOU SMOKED AT LEAST 100 CIGARETTES IN YOUR ENTIRE LIFE: NO/DON'T KNOW
DURING THE PAST 4 WEEKS HOW MUCH DID YOU FEEL SHORT OF BREATH: NONE/LITTLE OF THE TIME

## 2019-11-04 ASSESSMENT — ACTIVITIES OF DAILY LIVING (ADL): TOILETING: INDEPENDENT

## 2019-11-04 ASSESSMENT — PATIENT HEALTH QUESTIONNAIRE - PHQ9
SUM OF ALL RESPONSES TO PHQ9 QUESTIONS 1 AND 2: 0
2. FEELING DOWN, DEPRESSED, IRRITABLE, OR HOPELESS: NOT AT ALL
1. LITTLE INTEREST OR PLEASURE IN DOING THINGS: NOT AT ALL

## 2019-11-04 NOTE — DISCHARGE PLANNING
Care Transition Team Assessment    The information provided by this assessment was provided by pt and chart review. Pt confirmed that the information on facesheet was accurate. Pt currently working on establishing a PCP. Pt lives in a mobile home that has 5 steps to get inside. Pt lives with his spouse, Mercedez. Pt works full-time. Pt states to have a good support system. Pt independent with ADL's/IADL's prior to admit. Pt's insurance covers medications. Pt uses the [facility] on [address]. Pt denies mental health and substance abuse.     Pt discussed rounds and no needs at this time. LSW to assist as needed.       Information Source  Orientation : Oriented x 4  Information Given By: Patient  Informant's Name: (Traci Duffy)  Who is responsible for making decisions for patient? : Patient    Elopement Risk  Legal Hold: No  Ambulatory or Self Mobile in Wheelchair: Yes  Disoriented: No  Psychiatric Symptoms: None  History of Wandering: No  Elopement this Admit: No  Vocalizing Wanting to Leave: No  Displays Behaviors, Body Language Wanting to Leave: No-Not at Risk for Elopement  Elopement Risk: Not at Risk for Elopement    Interdisciplinary Discharge Planning  Lives with - Patient's Self Care Capacity: Spouse  Patient or legal guardian wants to designate a caregiver (see row info): No  Housing / Facility: Mobile Home  Prior Services: None    Discharge Preparedness  What is your plan after discharge?: Uncertain - pending medical team collaboration  What are your discharge supports?: Spouse  Prior Functional Level: Independent with Activities of Daily Living  Difficulity with ADLs: None  Difficulity with IADLs: None    Functional Assesment  Prior Functional Level: Independent with Activities of Daily Living    Finances  Financial Barriers to Discharge: No  Prescription Coverage: Yes    Vision / Hearing Impairment  Vision Impairment : Yes  Right Eye Vision: Wears Glasses  Left Eye Vision: Wears Glasses  Hearing  Impairment : No    Domestic Abuse  Have you ever been the victim of abuse or violence?: No  Physical Abuse or Sexual Abuse: No  Verbal Abuse or Emotional Abuse: No  Possible Abuse Reported to:: Not Applicable    Psychological Assessment  History of Substance Abuse: None  History of Psychiatric Problems: No  Non-compliant with Treatment: No    Discharge Risks or Barriers  Discharge risks or barriers?: No  Patient risk factors: No PCP    Anticipated Discharge Information  Anticipated discharge disposition: Home

## 2019-11-04 NOTE — ED NOTES
Pt being taken to MRI at this time by transport tech via gurney. Pt is awake and alert, talking to staff, in no apparent distress at time of transfer. Pt's paperwork and belongings sent upstairs with pt and tech. Transport tech notified to call floor RN when MRI is done to come  pt.

## 2019-11-04 NOTE — CONSULTS
"Telestroke Consultation Note    Please note that I could not evaluate the patient in person at the site. All examination and evaluation of the patient and information gathering from the patient and/or the family is done jointly by me via licensed and encrypted tele-video communication equipment and the requesting physician, Dr. Behzad Espinoza D.O.  As such, my assessments and recommendations are limited as far as such limited evaluation allows.    Hospital: [facility]  Consulting MD: Lino Duncan MD  Requesting Physician: Behzad Espinoza D.O.  Patient name:      Shawn Duffy  :         1960  MRN:         [id number]    Date of Service: 2019    Chief Complaint: ED Acute stroke code   Chief Complaint   Patient presents with   • Numbness     R side of face, tongue, R hand since 1730       History of Present Illness:  Shawn Duffy is a 59 y.o. man who presented for acute onset paresthesias to the right side of the tongue as of 1730pm 11/3/19. Patient drove to [facility] where en route the paresthesias affected the right hand, which was transient.  Workout as OSH included CT head and CTA head and neck.  Patient did not receive tPA.  Sent to [facility] for MRI capability and questionable \"basilar artery occlusion.\" Neurology was not contacted via the transfer center.  Patient currently hypertensive to the 180s systolic.  Denies HA.    Time of symptom onset: 1730  TLKW: 1730  Associated symptoms: none   Alleviating/exacerbating factors: none   Blood glucose: per EMR  Anticoagulation/Antiplatelet: not on AC    No alleviating or exacerbating factors identified. No other associated symptoms reported.    IV rtPA was not administered as the patient was outside of the window.    Review of systems: In addition to what is detailed in the HPI above, (and scanned into the chart if and when application), all other systems reviewed and are negative.    Allergies:  No Known Allergies    Hospital " Medications:  No current facility-administered medications for this encounter.     Current Outpatient Medications:   •  diclofenac EC (VOLTAREN) 75 MG Tablet Delayed Response, Take 75 mg by mouth every day., Disp: , Rfl:   •  amitriptyline (ELAVIL) 50 MG Tab, TAKE 1 TABLET BY MOUTH EVERY DAY AT BEDTIME AS NEEDED, Disp: 90 Tab, Rfl: 1    Past Medical History:  Past Medical History:   Diagnosis Date   • GERD (gastroesophageal reflux disease)    • Obesity, unspecified    • Other chronic nonalcoholic liver disease    • Sleep apnea 12/22/2009       Social History:  Social History     Socioeconomic History   • Marital status:      Spouse name: Not on file   • Number of children: Not on file   • Years of education: Not on file   • Highest education level: Not on file   Occupational History   • Not on file   Social Needs   • Financial resource strain: Not on file   • Food insecurity:     Worry: Not on file     Inability: Not on file   • Transportation needs:     Medical: Not on file     Non-medical: Not on file   Tobacco Use   • Smoking status: Never Smoker   • Smokeless tobacco: Never Used   Substance and Sexual Activity   • Alcohol use: Yes     Comment: rare   • Drug use: No   • Sexual activity: Yes     Partners: Female   Lifestyle   • Physical activity:     Days per week: Not on file     Minutes per session: Not on file   • Stress: Not on file   Relationships   • Social connections:     Talks on phone: Not on file     Gets together: Not on file     Attends Sabianist service: Not on file     Active member of club or organization: Not on file     Attends meetings of clubs or organizations: Not on file     Relationship status: Not on file   • Intimate partner violence:     Fear of current or ex partner: Not on file     Emotionally abused: Not on file     Physically abused: Not on file     Forced sexual activity: Not on file   Other Topics Concern   • Not on file   Social History Narrative       "      Family History (If relevant):  Family History   Problem Relation Age of Onset   • Arthritis Sister        Vitals:  Vitals:    11/03/19 2313 11/03/19 2314 11/03/19 2315   BP: (!) 189/110  (!) 189/110   Pulse:  88 85   Resp:  15 16   Temp:   36.4 °C (97.6 °F)   TempSrc:   Temporal   SpO2:  94%    Weight: 117.9 kg (260 lb)     Height: 1.651 m (5' 5\")         Physical Exam:    GCS:     NIH Stroke Scale:    1a. Level of Consciousness (Alert, drowsy, etc): 0= Alert    1b. LOC Questions (Month, age): 0= Answers both correctly    1c. LOC Commands (Open/close eyes make fist/let go): 0= Obeys both correctly    2.   Best Gaze (Eyes open - patient follows examiner's finger on face): 0= Normal    3.   Visual Fields (introduce visual stimulus/threat to patient's field quadrants): 0= No visual loss    4.   Facial Paresis (Show teeth, raise eyebrows and squeeze eyes shut): 0= Normal     5a. Motor Arm - Left (Elevate arm to 90 degrees if patient is sitting, 45 degrees if  supine): 0= No drift    5b. Motor Arm - Right (Elevate arm to 90 degrees if patient is sitting, 45 degrees if supine): 0= No drift    6a. Motor Leg - Left (Elevate leg 30 degrees with patient supine): 0= No drift    6b. Motor Leg - Right  (Elevate leg 30 degrees with patient supine): 0= No drift    7.   Limb Ataxia (Finger-nose, heel down shin): 0= No ataxia    8.   Sensory (Pin prick to face, arm, trunk and leg - compare side to side): 1= Partial loss    9.  Best Language (Name item, describe a picture and read sentences): 0= No aphasia    10. Dysarthria (Evaluate speech clarity by patient repeating listed words): 0= Normal articulation    11. Extinction and Inattention (Use information from prior testing to identify neglect or  double simultaneous stimuli testing): 0= No neglect    Total NIH Score: 1  Decision NOT to give alteplase: TLKW>4.5H    Imaging reviewed &  Visualized by me:    IMAGING FROM OSH BEING IMORTED TO SYNAPSE  Record viewed by me with no " "current imaging accessable    Laboratory:  Lab Results   Component Value Date/Time    PLATELETCT 264 11/03/2019 2357     No results found for: PROTHROMBTM, INR, APTT  Lab Results   Component Value Date/Time    GLUCOSE 89 01/22/2019 1302     Lab Results   Component Value Date/Time    CREATININE 0.75 01/22/2019 1302     Lab Results   Component Value Date/Time    IFAFRICA >60 01/22/2019 1302    IFNOTAFR >60 01/22/2019 1302       Assessment:   59 year old man with acute onset paresthesias.  It is unclear to the extent of posterior circulation atherosclerotic disease given the imaging is not viewable / imaging not imported to the system as of yet; however I am skeptical as a \"basilar artery occlusion\" would surely be devastating.  This patient has an NIHSS, and although a thalamic infarct, as supplied by the posterior circulation, could present as numbness to the right face and hand, I would anticipate a more striking clinical picture in true occlusion.  Rather, if stroke, then underlying etiology likely related to thromboembolism vs. cardioembolism.    Recommendations:   - STAT MRI with MRA head  - IMPORT OUTSIDE IMAGING  - Admit to the hospital for further workup of etiology and to provide secondary stroke prevention measures  - Neurology checks and vital signs per protocol  - Permissive HTN for 24-48 hours from onset of symptoms followed by goal BP <140 systolic  - obtain normoglycemia and avoid hypo- or hyper -natremia; aim for normothermia  - secondary stroke prevention therapy with ASA 81mg and Plavix 75mg for 21 days, from symptom onset, followed by ASA 325mg qd per CHANCE and POINT trials  - load with Plavix 300mg x1 NOW (per above stated trials)  - high intensity statin per SPARCL Trial  - serum studies for stroke risk factors: lipid panel & hemoglobin A1C  - Obtain a 2D echocardiogram w/ bubble study  - evaluate and treat with PT/OT/ST    Discussed with Dr. Behzad Espinoza.    Magdy Harris MD  Director, " [facility], [facility]  Neurohospitalist, [facility]  Clinical  of Neurology, [facility] [employer]  t) [phone] (f) [phone]

## 2019-11-04 NOTE — THERAPY
"Occupational Therapy Evaluation completed.   Functional Status:  Supervised/independent with ADLs and txfs  Plan of Care: Patient with no further skilled OT needs in the acute care setting at this time  Discharge Recommendations:   Currently anticipate no further skilled therapy needs once patient is discharged from the inpatient setting.    See \"Rehab Therapy-Acute\" Patient Summary Report for complete documentation.    "

## 2019-11-04 NOTE — CARE PLAN
Problem: Communication  Goal: The ability to communicate needs accurately and effectively will improve  Outcome: PROGRESSING AS EXPECTED     Problem: Safety  Goal: Will remain free from injury  Outcome: PROGRESSING AS EXPECTED     Problem: Venous Thromboembolism (VTW)/Deep Vein Thrombosis (DVT) Prevention:  Goal: Patient will participate in Venous Thrombosis (VTE)/Deep Vein Thrombosis (DVT)Prevention Measures  Outcome: PROGRESSING AS EXPECTED     Problem: Knowledge Deficit  Goal: Knowledge of disease process/condition, treatment plan, diagnostic tests, and medications will improve  Outcome: PROGRESSING AS EXPECTED  Note:   Reviewed stroke booklet and no intervention will be done, patient new medications, stroke prevention reviewed. All questions answered     Problem: Discharge Barriers/Planning  Goal: Patient's continuum of care needs will be met  Outcome: PROGRESSING AS EXPECTED  Note:   Waiting for ECHO to be completed then can discharge.

## 2019-11-04 NOTE — ASSESSMENT & PLAN NOTE
Patient is not a candidate for TPA due to NIH score of 0.    Patient will be placed on continuous cardiac monitoring to evaluate for any arrhythmias  Q4 hours neuro checks  I will order MRA head and neck stat to evaluate narrowing/occlusion of the basilar radiology/neurology recommended no acute intervention   Check 2-D echo- pending  Patient will be started on aspirin, plavix and high intensity statin according to neurology rec  Allow permissive hypertension  Check TSH, lipid panel   hemoglobin A1c 5.7  PTOT and ST evaluation- no placement needed

## 2019-11-04 NOTE — ED NOTES
Med rec updated and complete. Allergies reviewed.   Pt denies antibiotic use in last 14 days.  Home pharmacy [facility] [place]

## 2019-11-04 NOTE — PROGRESS NOTES
Pt. admitted onto neuroscience unit. Alert and oriented x 4 and able to follow all commands. NIHSS of 0 at this time. Ambulated from gurney to chair via SBA. Dysphagia screening completed. Bed alarm on. Call light in reach.

## 2019-11-04 NOTE — ED PROVIDER NOTES
ED Provider Note    CHIEF COMPLAINT  Chief Complaint   Patient presents with   • Numbness     R side of face, tongue, R hand since 1730       HPI  Shawn Duffy is a 59 y.o. male here for evaluation of paresthesias to the right side of mouth and tongue. The pt states that at 530 pm, he noted a sudden onset of paresthesias. This has been present since its onset, and nothing seems to alleviate or exacerbate the symptoms. The pt denies fall or trauma. No headache. He takes no anti coagulants. The pt has no history of the same. He decided to [facility].   On his way into the ER, his right hand 'went numb' and then resolved shortly after he arrived.  The doctor       ROS  See HPI for further details, o/w negative.     PAST MEDICAL HISTORY   has a past medical history of GERD (gastroesophageal reflux disease), Obesity, unspecified, Other chronic nonalcoholic liver disease, and Sleep apnea (12/22/2009).    SOCIAL HISTORY  Social History     Tobacco Use   • Smoking status: Never Smoker   • Smokeless tobacco: Never Used   Substance and Sexual Activity   • Alcohol use: Yes     Comment: rare   • Drug use: No   • Sexual activity: Yes     Partners: Female       Family History  No bleeding disorders     SURGICAL HISTORY   has a past surgical history that includes endoscopy,diagnostic w/biopsy (9/16/08) and other.    CURRENT MEDICATIONS  Home Medications     Reviewed by Taylor Bonds R.N. (Registered Nurse) on 11/03/19 at 2312  Med List Status: <None>   Medication Last Dose Status   amitriptyline (ELAVIL) 50 MG Tab  Active   Cholecalciferol (VITAMIN D) 2000 UNIT Tab  Active   DENTAGEL 1.1 % Gel  Active   diclofenac EC (VOLTAREN) 75 MG Tablet Delayed Response  Active   HYDROcodone-acetaminophen (NORCO) 5-325 MG Tab per tablet  Active   vitamin D, Ergocalciferol, (DRISDOL) 13724 units Cap capsule  Active                ALLERGIES  No Known Allergies    REVIEW OF SYSTEMS  See HPI for further  details. Review of systems as above, otherwise all other systems are negative.     PHYSICAL EXAM  Constitutional: Well developed, well nourished. No acute distress.  HEENT: Normocephalic, atraumatic. Posterior pharynx clear and moist.  Eyes:  EOMI. Normal sclera.  Neck: Supple, Full range of motion, nontender.  Chest/Pulmonary: clear to ausculation. Symmetrical expansion.   Cardio: Regular rate and rhythm with no murmur.   Abdomen: Soft, nontender. No peritoneal signs. No guarding. No palpable masses.  Back: No CVA tenderness, nontender midline, no step offs.  Musculoskeletal: No deformity, no edema, neurovascular intact.   Neuro: Clear speech, appropriate, cooperative, cranial nerves II-XII grossly intact.  Equal .  Good finger to nose. Mild paresthesia to right side of lips and tongue.   Psych: Normal mood and affect    PROCEDURES     MEDICAL RECORD  I have reviewed patient's medical record and pertinent results are listed.    COURSE & MEDICAL DECISION MAKING  I have reviewed any medical record information, laboratory studies and radiographic results as noted above.    CRITICAL CARE  The very real possibility of a deterioration of this patient's condition required the highest level of my preparedness for sudden, emergent intervention.  I provided critical care services, which included medication orders, frequent reevaluations of the patient's condition and response to treatment, ordering and reviewing test results, and discussing the case with various consultants.  The critical care time associated with the care of the patient was 37 minutes. Review chart for interventions. This time is exclusive of any other billable procedures.       11:34 PM  Paged neurology.     11:38 PM  I spoke to Dr. Harris regarding the patient.  He states that at this time the patient will need an MRI of the brain without contrast stat.  There will not be any alteplase given at this time, and he doubts that interventional radiology  will going to do anything secondary to the NIH being so low and the patient looking good from my perspective.  The patient has no focal deficits other than the right sided lip and right-sided tongue paresthesias.  Patient's blood pressure is 189/110, and neurology is okay with this hypertension at this time.  Permissive hypertension.    11:49 PM  I spoke to Dr. Rojas, who will admit the pt.     12:13 AM  The pt remains unchanged.   No fever. Non toxic appearing.       FINAL IMPRESSION  Strokelike symptoms  Critical care 37 minutes       Electronically signed by: Behzad Espinoza, 11/3/2019 11:34 PM

## 2019-11-04 NOTE — PROGRESS NOTES
Brief neurology note:     Patient seen and evaluated. Neurologically intact w/ exception of R perioral numbness and numbness of anterior 2/3rds of tongue. Correlates w/ potentially a small brainstem infarction which was not seen on MRI. MR Brain W/O CST did not reveal stroke. MRA shows severely stenotic basilar artery. Discussed w/ radiology Dr. Weinberg. At this time there is no role for intervention and suggests maximal medical management w/ close outpt followup.     Plan:   -ASA 81mg PO Daily + Plavix 75mg PO Daily x 90 days. Then Plavix 75mg PO Daily.  -Continue high dose statin indefinitely.  -Goal outpt SBP <130-140.  -Goal LDL < 70  -Goal A1C < 7  -Followup in stroke bridge clinic.  -Neurology signing off.

## 2019-11-04 NOTE — H&P
Hospital Medicine History & Physical Note    Date of Service  11/3/2019    Primary Care Physician  Pcp Pt States None    Consultants  Neurology    Code Status  Full code    Chief Complaint  Facial numbness    History of Presenting Illness  59 y.o. male with a past medical history of obesity, GERD, obstructive sleep apnea who presented 11/3/2019 with right facial numbness that started at 5 PM today.  Patient was in his usual state of health when he suddenly developed symptoms of numbness of his right face and tongue.  His symptoms persisted therefore he decided to present to an outlying facility.  He denied any headache, vision changes, focal weakness, change in speech or fevers.  At the outlying facility he transiently developed right hand numbness which has now resolved.  At this time the patient continues to note numbness of his tongue and right face.    He presented to [facility].  I reviewed the medical records from the transferring facility which are summarized as follows:    WBC 13.1, hemoglobin 16.6, hematocrit 48.3, MCV 96.4, platelets 334  PT 10.3, INR 1, PTT 24  Sodium 142, potassium 3.7, chloride 104, CO2 31, anion gap 7, glucose 85, BUN 24, creatinine 1, calcium 8.8, albumin 3.6, protein 8.5, total bili 0.5, alkaline phosphatase 118, AST 49,   Troponin less than 0.017    CT head without contrast: Mild cerebral atrophy.  No acute intracranial hemorrhage.  There are foci of decreased attenuation in the basal ganglia, bilaterally consistent with age-indeterminate lacunar infarcts.  This is a new finding from 11/9/2011    CTA head with IV contrast: The basilar artery is severely narrowed or occluded over a length of the approximately 1.3 cm.  There is mild calcified plaque at the region of the bilateral internal carotid arteries.  Bilateral anterior, posterior and middle cerebral arteries are unremarkable.  Chest x-ray: No acute cardiopulmonary process    The patient was evaluated  by neurology Dr. Duncan. TPA was not administered due to low NIH score of 0.  The patient was given a full dose of Plavix and aspirin prior to transfer.  The patient was transferred to [facility] for stat MRI and MRA for evaluation of narrowing and possible clot retrieval.    Review of Systems  Review of Systems   Constitutional: Negative for chills, diaphoresis and fever.   HENT: Negative for hearing loss and sore throat.    Eyes: Negative for blurred vision.   Respiratory: Negative for cough, sputum production, shortness of breath and wheezing.    Cardiovascular: Negative for chest pain, palpitations and leg swelling.   Gastrointestinal: Negative for abdominal pain, blood in stool, diarrhea, nausea and vomiting.   Genitourinary: Negative for dysuria, flank pain and urgency.   Musculoskeletal: Negative for back pain, joint pain, myalgias and neck pain.   Skin: Negative for rash.   Neurological: Positive for sensory change. Negative for dizziness, focal weakness, seizures and headaches.   Endo/Heme/Allergies: Does not bruise/bleed easily.   Psychiatric/Behavioral: Negative for suicidal ideas.   All other systems reviewed and are negative.      Past Medical History   has a past medical history of GERD (gastroesophageal reflux disease), Obesity, unspecified, Other chronic nonalcoholic liver disease, and Sleep apnea (12/22/2009).    Surgical History   has a past surgical history that includes endoscopy,diagnostic w/biopsy (9/16/08) and other.     Family History  family history includes Arthritis in his sister.     Social History   reports that he has never smoked. He has never used smokeless tobacco. He reports current alcohol use. He reports that he does not use drugs.    Allergies  No Known Allergies    Medications  Prior to Admission Medications   Prescriptions Last Dose Informant Patient Reported? Taking?   amitriptyline (ELAVIL) 50 MG Tab   No No   Sig: TAKE 1 TABLET BY MOUTH EVERY DAY AT BEDTIME AS NEEDED       Facility-Administered Medications: None       Physical Exam  Temp:  [36.4 °C (97.6 °F)] 36.4 °C (97.6 °F)  Pulse:  [85-88] 85  Resp:  [15-16] 16  BP: (189)/(110) 189/110  SpO2:  [94 %] 94 %    Physical Exam  Vitals signs and nursing note reviewed.   Constitutional:       General: He is not in acute distress.     Appearance: Normal appearance. He is obese. He is not toxic-appearing.   HENT:      Head: Normocephalic and atraumatic.      Nose: Nose normal.      Mouth/Throat:      Mouth: Mucous membranes are moist.   Eyes:      Extraocular Movements: Extraocular movements intact.      Conjunctiva/sclera: Conjunctivae normal.      Pupils: Pupils are equal, round, and reactive to light.   Neck:      Musculoskeletal: Normal range of motion and neck supple.   Cardiovascular:      Rate and Rhythm: Normal rate and regular rhythm.      Pulses: Normal pulses.      Heart sounds: Normal heart sounds.   Pulmonary:      Effort: Pulmonary effort is normal. No respiratory distress.      Breath sounds: Normal breath sounds. No wheezing, rhonchi or rales.   Abdominal:      General: Bowel sounds are normal. There is no distension.      Palpations: Abdomen is soft.      Tenderness: There is no tenderness.   Musculoskeletal: Normal range of motion.         General: No swelling or tenderness.   Lymphadenopathy:      Cervical: No cervical adenopathy.   Skin:     General: Skin is warm.   Neurological:      General: No focal deficit present.      Mental Status: He is alert and oriented to person, place, and time.      Cranial Nerves: No cranial nerve deficit.      Coordination: Coordination normal.      Comments: Strength and sensation intact bilaterally   Psychiatric:         Mood and Affect: Mood normal.         Behavior: Behavior normal.         Laboratory:          No results for input(s): ALTSGPT, ASTSGOT, ALKPHOSPHAT, TBILIRUBIN, DBILIRUBIN, GAMMAGT, AMYLASE, LIPASE, ALB, PREALBUMIN, GLUCOSE in the last 72 hours.      No results for  input(s): NTPROBNP in the last 72 hours.      No results for input(s): TROPONINT in the last 72 hours.    Urinalysis:    No results found     Imaging:  MR-BRAIN-W/O    (Results Pending)   MR-MRA HEAD-W/O    (Results Pending)   MR-MRA NECK-W/O    (Results Pending)   EC-ECHOCARDIOGRAM COMPLETE W/O CONT    (Results Pending)         Assessment/Plan:  I anticipate this patient will require at least two midnights for appropriate medical management, necessitating inpatient admission.    Stroke (HCC)- (present on admission)  Assessment & Plan  Patient is not a candidate for TPA due to NIH score of 0.    Patient will be placed on continuous cardiac monitoring to evaluate for any arrhythmias  Q4 hours neuro checks  I will order MRA head and neck stat to evaluate narrowing/occlusion of the basilar artery.  Will discuss with radiology/neurology if intervention is indicated.   Check 2-D echo  Patient will be started on aspirin, plavix and high intensity statin  Allow permissive hypertension  Check TSH, lipid panel and hemoglobin A1c  PTOT and ST evaluation  Neurology has been consulted      Hypertension- (present on admission)  Assessment & Plan  Allow permissive hypertension with IV labetalol and IV hydralazine PRN for SBP greater than 220 or DBP greater than 120    Obesity- (present on admission)  Assessment & Plan  Body mass index is 43.27 kg/m².  Recommended outpatient weight management program and bariatric surgery evaluation      Sleep apnea- (present on admission)  Assessment & Plan  Nocturnal CPAP      VTE prophylaxis: Lovenox    I spent a total of 30 minutes of non face to face time performing additional research, reviewing medical records from transferring facility, discussing plan of care with other healthcare providers. Start time: 11 25 pm. End time: 11 55 pm

## 2019-11-04 NOTE — DIETARY
NUTRITION SERVICES: BMI - Pt with BMI >40 (=Body mass index is 43.27 kg/m².), morbid obesity. Weight loss counseling not appropriate in acute care setting. RECOMMEND - Referral to outpatient nutrition services for weight management after D/C.

## 2019-11-04 NOTE — THERAPY
"Physical Therapy Evaluation completed.   Bed Mobility:  Supine to Sit: Supervised  Transfers: Sit to Stand: Supervised  Gait: Level Of Assist: Supervised with No Equipment Needed       Plan of Care: Patient with no further skilled PT needs in the acute care setting at this time  Discharge Recommendations: Equipment: No Equipment Needed. Anticipate that the patient will have no further physical therapy needs after discharge from the hospital.    See \"Rehab Therapy-Acute\" Patient Summary Report for complete documentation.     Pt was recently admitted for R facial numbness. During PT evaluation pt did not demonstrate with any neurological deficits affecting the patients mobility. Pt continues to complain of R sided lip and tounge numbness. Pt able to demonstrate with near baseline gait mechainc. Pt demosntrates wit antalgic gait with step to pattern secondary to baseline low back pain and BLE knee pain due to OA. Pt was able to ambulate for 150ft and declined to go up/down steps as he reports he feels comfrotable going up/down steps and appears clinically to be able to do so. Pt is in no acute skilled PT needs, anticipate pt to d/c home once medically clear.   "

## 2019-11-05 VITALS
OXYGEN SATURATION: 95 % | HEIGHT: 65 IN | TEMPERATURE: 99 F | RESPIRATION RATE: 18 BRPM | HEART RATE: 88 BPM | BODY MASS INDEX: 43.32 KG/M2 | SYSTOLIC BLOOD PRESSURE: 160 MMHG | WEIGHT: 260 LBS | DIASTOLIC BLOOD PRESSURE: 98 MMHG

## 2019-11-05 LAB
ANION GAP SERPL CALC-SCNC: 6 MMOL/L (ref 0–11.9)
BASOPHILS # BLD AUTO: 0.3 % (ref 0–1.8)
BASOPHILS # BLD: 0.04 K/UL (ref 0–0.12)
BUN SERPL-MCNC: 16 MG/DL (ref 8–22)
CALCIUM SERPL-MCNC: 9.4 MG/DL (ref 8.5–10.5)
CHLORIDE SERPL-SCNC: 104 MMOL/L (ref 96–112)
CHOLEST SERPL-MCNC: 123 MG/DL (ref 100–199)
CO2 SERPL-SCNC: 28 MMOL/L (ref 20–33)
CREAT SERPL-MCNC: 0.86 MG/DL (ref 0.5–1.4)
EOSINOPHIL # BLD AUTO: 0.27 K/UL (ref 0–0.51)
EOSINOPHIL NFR BLD: 2.2 % (ref 0–6.9)
ERYTHROCYTE [DISTWIDTH] IN BLOOD BY AUTOMATED COUNT: 41.1 FL (ref 35.9–50)
GLUCOSE SERPL-MCNC: 113 MG/DL (ref 65–99)
HCT VFR BLD AUTO: 44.8 % (ref 42–52)
HDLC SERPL-MCNC: 39 MG/DL
HGB BLD-MCNC: 15.5 G/DL (ref 14–18)
IMM GRANULOCYTES # BLD AUTO: 0.05 K/UL (ref 0–0.11)
IMM GRANULOCYTES NFR BLD AUTO: 0.4 % (ref 0–0.9)
LDLC SERPL CALC-MCNC: 68 MG/DL
LYMPHOCYTES # BLD AUTO: 2.19 K/UL (ref 1–4.8)
LYMPHOCYTES NFR BLD: 18.1 % (ref 22–41)
MCH RBC QN AUTO: 33.2 PG (ref 27–33)
MCHC RBC AUTO-ENTMCNC: 34.6 G/DL (ref 33.7–35.3)
MCV RBC AUTO: 95.9 FL (ref 81.4–97.8)
MONOCYTES # BLD AUTO: 0.9 K/UL (ref 0–0.85)
MONOCYTES NFR BLD AUTO: 7.4 % (ref 0–13.4)
NEUTROPHILS # BLD AUTO: 8.68 K/UL (ref 1.82–7.42)
NEUTROPHILS NFR BLD: 71.6 % (ref 44–72)
NRBC # BLD AUTO: 0 K/UL
NRBC BLD-RTO: 0 /100 WBC
PLATELET # BLD AUTO: 254 K/UL (ref 164–446)
PMV BLD AUTO: 9.8 FL (ref 9–12.9)
POTASSIUM SERPL-SCNC: 3.9 MMOL/L (ref 3.6–5.5)
RBC # BLD AUTO: 4.67 M/UL (ref 4.7–6.1)
SODIUM SERPL-SCNC: 138 MMOL/L (ref 135–145)
TRIGL SERPL-MCNC: 81 MG/DL (ref 0–149)
WBC # BLD AUTO: 12.1 K/UL (ref 4.8–10.8)

## 2019-11-05 PROCEDURE — 80048 BASIC METABOLIC PNL TOTAL CA: CPT

## 2019-11-05 PROCEDURE — 700102 HCHG RX REV CODE 250 W/ 637 OVERRIDE(OP): Performed by: HOSPITALIST

## 2019-11-05 PROCEDURE — 700102 HCHG RX REV CODE 250 W/ 637 OVERRIDE(OP): Performed by: PSYCHIATRY & NEUROLOGY

## 2019-11-05 PROCEDURE — 700111 HCHG RX REV CODE 636 W/ 250 OVERRIDE (IP): Performed by: INTERNAL MEDICINE

## 2019-11-05 PROCEDURE — 700102 HCHG RX REV CODE 250 W/ 637 OVERRIDE(OP): Performed by: INTERNAL MEDICINE

## 2019-11-05 PROCEDURE — A9270 NON-COVERED ITEM OR SERVICE: HCPCS | Performed by: HOSPITALIST

## 2019-11-05 PROCEDURE — 99239 HOSP IP/OBS DSCHRG MGMT >30: CPT | Performed by: HOSPITALIST

## 2019-11-05 PROCEDURE — 80061 LIPID PANEL: CPT

## 2019-11-05 PROCEDURE — A9270 NON-COVERED ITEM OR SERVICE: HCPCS | Performed by: INTERNAL MEDICINE

## 2019-11-05 PROCEDURE — 36415 COLL VENOUS BLD VENIPUNCTURE: CPT

## 2019-11-05 PROCEDURE — A9270 NON-COVERED ITEM OR SERVICE: HCPCS | Performed by: PSYCHIATRY & NEUROLOGY

## 2019-11-05 PROCEDURE — 85025 COMPLETE CBC W/AUTO DIFF WBC: CPT

## 2019-11-05 RX ORDER — LISINOPRIL 5 MG/1
5 TABLET ORAL
Status: DISCONTINUED | OUTPATIENT
Start: 2019-11-05 | End: 2019-11-05 | Stop reason: HOSPADM

## 2019-11-05 RX ORDER — CLOPIDOGREL BISULFATE 75 MG/1
75 TABLET ORAL DAILY
Qty: 30 TAB | Refills: 0 | Status: SHIPPED | OUTPATIENT
Start: 2019-11-06 | End: 2019-11-21 | Stop reason: SDUPTHER

## 2019-11-05 RX ORDER — LISINOPRIL 5 MG/1
5 TABLET ORAL DAILY
Qty: 30 TAB | Refills: 0 | Status: SHIPPED | OUTPATIENT
Start: 2019-11-05 | End: 2019-11-21 | Stop reason: SDUPTHER

## 2019-11-05 RX ORDER — ATORVASTATIN CALCIUM 80 MG/1
80 TABLET, FILM COATED ORAL EVERY EVENING
Qty: 30 TAB | Refills: 0 | Status: SHIPPED | OUTPATIENT
Start: 2019-11-05 | End: 2019-11-21 | Stop reason: SDUPTHER

## 2019-11-05 RX ORDER — ASPIRIN 81 MG/1
81 TABLET, CHEWABLE ORAL DAILY
Qty: 90 TAB | Refills: 0 | Status: SHIPPED | OUTPATIENT
Start: 2019-11-06 | End: 2020-06-22

## 2019-11-05 RX ADMIN — LISINOPRIL 5 MG: 5 TABLET ORAL at 09:10

## 2019-11-05 RX ADMIN — ENOXAPARIN SODIUM 40 MG: 100 INJECTION SUBCUTANEOUS at 04:40

## 2019-11-05 RX ADMIN — ASPIRIN 81 MG 81 MG: 81 TABLET ORAL at 04:40

## 2019-11-05 RX ADMIN — CLOPIDOGREL BISULFATE 75 MG: 75 TABLET ORAL at 04:40

## 2019-11-05 NOTE — PROGRESS NOTES
PT discharged home per md order.  IV removed.  Pt's repeat bp after lisinopril administration (though only 15 mins later) was 160/98.  Dr. Zuniga aware and said ok for pt to leave.  Discussed discharge instruction.  Encouraged pt to monitor his own blood pressure.  All questions answered.  Pt wheeled out to car by staff.

## 2019-11-05 NOTE — DISCHARGE SUMMARY
Discharge Summary    CHIEF COMPLAINT ON ADMISSION  Chief Complaint   Patient presents with   • Numbness     R side of face, tongue, R hand since 1730       Reason for Admission  EMS     Admission Date  11/3/2019    CODE STATUS  Full Code    HPI & HOSPITAL COURSE  This is a 59 y.o. male with obesity, GERD and HERMILA here with right facial and tongue numbness. Symptoms presented suddenly and were not associated with any additional symptoms. At the outside facility and was transferred here for higher level of care. Neurology was consulted initially via tele-stroke consult and then re-evaluated in person once he was transferred. He was started on ASA and plavix x90 days and then counseled to continue plavix daily, thereafter. BP was controlled and started on high intensity statin. He felt improved and his symptoms resolved. MRI showed an old lacunar infarct of the right thalamus. MRA showed high grade stenosis vs occulusion of the distal basilar artery. He felt improved and was eager for discharge.        Therefore, he is discharged in good and stable condition to home with close outpatient follow-up.    The patient met 2-midnight criteria for an inpatient stay at the time of discharge.    Discharge Date  11/5/2019    FOLLOW UP ITEMS POST DISCHARGE  Please follow up with your PCP and neurology.    DISCHARGE DIAGNOSES  Active Problems:    Basilar artery occlusion POA: Yes    Sleep apnea POA: Yes    Obesity POA: Yes    Hypertension POA: Yes  Resolved Problems:    * No resolved hospital problems. *      FOLLOW UP  Pcp Pt States None            MEDICATIONS ON DISCHARGE     Medication List      START taking these medications      Instructions   aspirin 81 MG Chew chewable tablet  Start taking on:  November 6, 2019  Commonly known as:  ASA   Doctor's comments:  ASA 81mg PO Daily + Plavix 75mg PO Daily x 90 days. Then Plavix 75mg PO Daily.  Take 1 Tab by mouth every day.  Dose:  81 mg     atorvastatin 80 MG tablet  Commonly known  as:  LIPITOR   Take 1 Tab by mouth every evening.  Dose:  80 mg     clopidogrel 75 MG Tabs  Start taking on:  November 6, 2019  Commonly known as:  PLAVIX   Doctor's comments:  ASA 81mg PO Daily + Plavix 75mg PO Daily x 90 days. Then Plavix 75mg PO Daily.  Take 1 Tab by mouth every day.  Dose:  75 mg     lisinopril 5 MG Tabs  Commonly known as:  PRINIVIL   Take 1 Tab by mouth every day.  Dose:  5 mg        CONTINUE taking these medications      Instructions   amitriptyline 50 MG Tabs  Commonly known as:  ELAVIL   TAKE 1 TABLET BY MOUTH EVERY DAY AT BEDTIME AS NEEDED        STOP taking these medications    diclofenac EC 75 MG Tbec  Commonly known as:  VOLTAREN            Allergies  No Known Allergies    DIET  Orders Placed This Encounter   Procedures   • Diet Order Regular     Standing Status:   Standing     Number of Occurrences:   1     Order Specific Question:   Diet:     Answer:   Regular [1]       ACTIVITY  As tolerated.  Weight bearing as tolerated    CONSULTATIONS  Neurology    PROCEDURES  EC-ECHOCARDIOGRAM COMPLETE W/O CONT   Final Result      MR-MRA NECK-W/O   Final Result         1.  No evidence of significant carotid stenosis.      2.  Nondominant mildly hypoplastic right vertebral artery.      MR-MRA HEAD-W/O   Final Result         1.  Loss of flow signal intensity in the mid to distal basilar artery consistent with high-grade stenosis and/or occlusion.      2.  Nondominant hypoplastic right vertebral artery with nonvisualization of this V4 segment.      3.  Fetal origin of the left posterior cerebral artery.      4.  Normal anterior circulation.      MR-BRAIN-W/O   Final Result         1. Age-related cerebral atrophy.      2. Mild periventricular white matter changes consistent with chronic microvascular ischemic gliosis.      3. Old curvilinear area of lacunar infarction in the right thalamus.      4. Prominent perivascular spaces in the basal ganglia bilaterally.      CT-FOREIGN FILM CAT SCAN   Final  Result      OUTSIDE IMAGES-CT HEAD   Final Result            LABORATORY  Lab Results   Component Value Date    SODIUM 138 11/05/2019    POTASSIUM 3.9 11/05/2019    CHLORIDE 104 11/05/2019    CO2 28 11/05/2019    GLUCOSE 113 (H) 11/05/2019    BUN 16 11/05/2019    CREATININE 0.86 11/05/2019    CREATININE 0.9 04/14/2009        Lab Results   Component Value Date    WBC 12.1 (H) 11/05/2019    HEMOGLOBIN 15.5 11/05/2019    HEMATOCRIT 44.8 11/05/2019    PLATELETCT 254 11/05/2019        Total time of the discharge process exceeds 34 minutes.

## 2019-11-05 NOTE — PROGRESS NOTES
Hospital Medicine Daily Progress Note    Date of Service  11/4/2019    Chief Complaint  59 y.o. male admitted 11/3/2019 with past medical history of obesity, GERD, obstructive sleep apnea who presented 11/3/2019 with right facial numbness     Hospital Course    The patient was transferred from another facility for having been found basilar artery occlusion on CTA head.      Interval Problem Update  11/4: Patient states his symptoms are persistent. Neurology and radiology decided for medical management. Cardiac echo is pending. Continue ASA, Plavix and statin. Lipid panel is pending.    Consultants/Specialty  Neurology  Radiology    Code Status  Full    Disposition  Home    Review of Systems  Review of Systems   Constitutional: Negative for chills, diaphoresis and fever.   HENT: Negative for hearing loss and sore throat.    Eyes: Negative for blurred vision.   Respiratory: Negative for cough, sputum production, shortness of breath and wheezing.    Cardiovascular: Negative for chest pain, palpitations and leg swelling.   Gastrointestinal: Negative for abdominal pain, blood in stool, diarrhea, nausea and vomiting.   Genitourinary: Negative for dysuria, flank pain and urgency.   Musculoskeletal: Negative for back pain, joint pain, myalgias and neck pain.   Skin: Negative for rash.   Neurological: Positive for sensory change. Negative for dizziness, focal weakness, seizures and headaches.   Endo/Heme/Allergies: Does not bruise/bleed easily.   Psychiatric/Behavioral: Negative for suicidal ideas.   All other systems reviewed and are negative.       Physical Exam  Temp:  [35.9 °C (96.7 °F)-37.2 °C (99 °F)] 37.2 °C (99 °F)  Pulse:  [] 94  Resp:  [12-20] 18  BP: (123-191)/() 171/100  SpO2:  [90 %-100 %] 98 %    Physical Exam  Vitals signs and nursing note reviewed.   Constitutional:       General: He is not in acute distress.     Appearance: Normal appearance. He is obese. He is not toxic-appearing.   HENT:       Head: Normocephalic and atraumatic.      Nose: Nose normal.      Mouth/Throat:      Mouth: Mucous membranes are moist.   Eyes:      Extraocular Movements: Extraocular movements intact.      Conjunctiva/sclera: Conjunctivae normal.      Pupils: Pupils are equal, round, and reactive to light.   Neck:      Musculoskeletal: Normal range of motion and neck supple.   Cardiovascular:      Rate and Rhythm: Normal rate and regular rhythm.      Pulses: Normal pulses.      Heart sounds: Normal heart sounds.   Pulmonary:      Effort: Pulmonary effort is normal. No respiratory distress.      Breath sounds: Normal breath sounds. No wheezing, rhonchi or rales.   Abdominal:      General: Bowel sounds are normal. There is no distension.      Palpations: Abdomen is soft.      Tenderness: There is no tenderness.   Musculoskeletal: Normal range of motion.         General: No swelling or tenderness.   Lymphadenopathy:      Cervical: No cervical adenopathy.   Skin:     General: Skin is warm.   Neurological:      General: No focal deficit present.      Mental Status: He is alert and oriented to person, place, and time.      Cranial Nerves: Cranial nerve deficit (right perio oral numbness, posterior right tongue numbess) present.      Coordination: Coordination normal.      Comments: Strength and sensation intact bilaterally   Psychiatric:         Mood and Affect: Mood normal.         Behavior: Behavior normal.         Fluids    Intake/Output Summary (Last 24 hours) at 11/4/2019 2112  Last data filed at 11/4/2019 1800  Gross per 24 hour   Intake 600 ml   Output --   Net 600 ml       Laboratory  Recent Labs     11/03/19 2357   WBC 14.4*   RBC 4.52*   HEMOGLOBIN 14.9   HEMATOCRIT 43.9   MCV 97.1   MCH 33.0   MCHC 33.9   RDW 43.1   PLATELETCT 264   MPV 9.8     Recent Labs     11/03/19 2357   SODIUM 137   POTASSIUM 3.5*   CHLORIDE 105   CO2 23   GLUCOSE 96   BUN 19   CREATININE 0.83   CALCIUM 8.7     Recent Labs     11/03/19  2357   INR 0.97                Imaging  EC-ECHOCARDIOGRAM COMPLETE W/O CONT   Final Result      MR-MRA NECK-W/O   Final Result         1.  No evidence of significant carotid stenosis.      2.  Nondominant mildly hypoplastic right vertebral artery.      MR-MRA HEAD-W/O   Final Result         1.  Loss of flow signal intensity in the mid to distal basilar artery consistent with high-grade stenosis and/or occlusion.      2.  Nondominant hypoplastic right vertebral artery with nonvisualization of this V4 segment.      3.  Fetal origin of the left posterior cerebral artery.      4.  Normal anterior circulation.      MR-BRAIN-W/O   Final Result         1. Age-related cerebral atrophy.      2. Mild periventricular white matter changes consistent with chronic microvascular ischemic gliosis.      3. Old curvilinear area of lacunar infarction in the right thalamus.      4. Prominent perivascular spaces in the basal ganglia bilaterally.      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    Patient will be placed on continuous cardiac monitoring to evaluate for any arrhythmias  Q4 hours neuro checks  I will order MRA head and neck stat to evaluate narrowing/occlusion of the basilar radiology/neurology recommended no acute intervention   Check 2-D echo- pending  Patient will be started on aspirin, plavix and high intensity statin according to neurology rec  Allow permissive hypertension  Check TSH, lipid panel   hemoglobin A1c 5.7  PTOT and ST evaluation- no placement needed        Hypertension- (present on admission)  Assessment & Plan  controlled  Continue current home medications  IV as needed medications have been ordered      Obesity- (present on admission)  Assessment & Plan  Body mass index is 43.27 kg/m².  Recommended outpatient weight management  program and bariatric surgery evaluation      Sleep apnea- (present on admission)  Assessment & Plan  Nocturnal CPAP       VTE prophylaxis: lovenox

## 2019-11-05 NOTE — DISCHARGE INSTRUCTIONS
"  Discharge Instructions per Cierra Zuniga M.D.    Please follow up with your PCP and the [facility]    DIET: low sodium    ACTIVITY: as tolerated    DIAGNOSIS: basilar artery stenosis and uncontrolled hypertension    Return to ER if you develop new or worsening symptoms.    Discharge Instructions    Discharged to home by car with relative. Discharged via walking, hospital escort: Yes.  Special equipment needed: Not Applicable    Be sure to schedule a follow-up appointment with your primary care doctor or any specialists as instructed.     Discharge Plan:   Influenza Vaccine Indication: Patient Refuses    I understand that a diet low in cholesterol, fat, and sodium is recommended for good health. Unless I have been given specific instructions below for another diet, I accept this instruction as my diet prescription.   Other diet: regular    Special Instructions:     Stroke/CVA/TIA/Hemorrhagic Ischemia Discharge Instructions  You have had a stroke. Your risk factors have been identified as follows:  Age - Over 55  Gender - Men are at a higher risk than women  Previous TIAs or \"mini strokes\"  High Cholesterol and lipids  Overweight  It is important that you reduce your risk factors to avoid another stroke in the future. Here are some general guidelines to follow:  · Eat healthy - avoid food high in fat.  · Get regular exercise.  · Maintain a healthy weight.  · Avoid smoking.  · Avoid alcohol and illegal drug use.  · Take your medications as directed.  For more information regarding risk factors, refer to pages 17-19 in your Stroke Patient Education Guide. Stroke Education Guide was given to patient.    Warning signs of a stroke include (which can also be found on page 3 of your Stroke Patient Education Guide):  · Sudden numbness of weakness of the face, arm or leg (especially on one side of the body).  · Sudden confusion, trouble speaking or understanding.  · Sudden trouble seeing in one or both " eyes.  · Sudden trouble walking, dizziness, loss of balance or coordination.  · Sudden severe headache with no known cause.  It is very important to get treatment quickly when a stroke occurs. If you experience any of the above warning signs, call [phone] immediately.     Some patients who have had a stroke will be going home on a blood thinner medication called Warfarin (Coumadin).  This medication requires very close monitoring and follow up.  This follow up can be provided by either your Primary Care Physician or by [facility] Outpatient Anticoagulation Service.  The Outpatient Anticoagulation Service is located at the [place] [facility] at [facility]  If you do not know when your follow up appointment is scheduled, call [phone] to verify your appointment time.      · Is patient discharged on Warfarin / Coumadin?   No     Depression / Suicide Risk    As you are discharged from this [facility], it is important to learn how to keep safe from harming yourself.    Recognize the warning signs:  · Abrupt changes in personality, positive or negative- including increase in energy   · Giving away possessions  · Change in eating patterns- significant weight changes-  positive or negative  · Change in sleeping patterns- unable to sleep or sleeping all the time   · Unwillingness or inability to communicate  · Depression  · Unusual sadness, discouragement and loneliness  · Talk of wanting to die  · Neglect of personal appearance   · Rebelliousness- reckless behavior  · Withdrawal from people/activities they love  · Confusion- inability to concentrate     If you or a loved one observes any of these behaviors or has concerns about self-harm, here's what you can do:  · Talk about it- your feelings and reasons for harming yourself  · Remove any means that you might use to hurt yourself (examples: pills, rope, extension cords, firearm)  · Get professional help  from the community (Mental Health, Substance Abuse, psychological counseling)  · Do not be alone:Call your Safe Contact- someone whom you trust who will be there for you.  · Call your local CRISIS HOTLINE 568-3623 or 687-790-6084  · Call your local Children's Mobile Crisis Response Team [place] (850) 309-0800 or www.[employer].com  · Call the toll free National Suicide Prevention Hotlines   · National Suicide Prevention Lifeline 673-925-HYBW (5276)  · National Hope Line Network 800-SUICIDE (927-9858)

## 2019-11-05 NOTE — PROGRESS NOTES
Monitor summary: SR [id number] -140, LA 0.14, QRS 0.10, QT 0.38, with rare couplet per strip from monitor room.

## 2019-11-05 NOTE — PROGRESS NOTES
Pt. alert and oriented x 4 and able to follow all commands. Plan of care discussed. Call light in reach. Telemetry monitor in place. Hourly rounding in place.

## 2019-11-14 ENCOUNTER — OFFICE VISIT (OUTPATIENT)
Dept: MEDICAL GROUP | Facility: PHYSICIAN GROUP | Age: 59
End: 2019-11-14
Payer: COMMERCIAL

## 2019-11-14 VITALS
HEART RATE: 104 BPM | HEIGHT: 65 IN | TEMPERATURE: 97.3 F | DIASTOLIC BLOOD PRESSURE: 72 MMHG | WEIGHT: 266 LBS | BODY MASS INDEX: 44.32 KG/M2 | OXYGEN SATURATION: 96 % | SYSTOLIC BLOOD PRESSURE: 128 MMHG

## 2019-11-14 DIAGNOSIS — M54.50 CHRONIC RIGHT-SIDED LOW BACK PAIN WITHOUT SCIATICA: ICD-10-CM

## 2019-11-14 DIAGNOSIS — Z09 HOSPITAL DISCHARGE FOLLOW-UP: ICD-10-CM

## 2019-11-14 DIAGNOSIS — M17.0 PRIMARY OSTEOARTHRITIS OF BOTH KNEES: ICD-10-CM

## 2019-11-14 DIAGNOSIS — I10 ESSENTIAL HYPERTENSION: ICD-10-CM

## 2019-11-14 DIAGNOSIS — G47.33 OBSTRUCTIVE SLEEP APNEA SYNDROME: ICD-10-CM

## 2019-11-14 DIAGNOSIS — Z23 NEED FOR VACCINATION: ICD-10-CM

## 2019-11-14 DIAGNOSIS — E83.110 HEREDITARY HEMOCHROMATOSIS (HCC): ICD-10-CM

## 2019-11-14 DIAGNOSIS — G89.29 CHRONIC RIGHT-SIDED LOW BACK PAIN WITHOUT SCIATICA: ICD-10-CM

## 2019-11-14 DIAGNOSIS — M79.672 BILATERAL FOOT PAIN: ICD-10-CM

## 2019-11-14 DIAGNOSIS — E66.01 MORBID OBESITY WITH BMI OF 40.0-44.9, ADULT (HCC): ICD-10-CM

## 2019-11-14 DIAGNOSIS — I65.1 BASILAR ARTERY OCCLUSION: ICD-10-CM

## 2019-11-14 DIAGNOSIS — M79.671 BILATERAL FOOT PAIN: ICD-10-CM

## 2019-11-14 PROCEDURE — 90686 IIV4 VACC NO PRSV 0.5 ML IM: CPT | Performed by: PHYSICIAN ASSISTANT

## 2019-11-14 PROCEDURE — 90471 IMMUNIZATION ADMIN: CPT | Performed by: PHYSICIAN ASSISTANT

## 2019-11-14 PROCEDURE — 99214 OFFICE O/P EST MOD 30 MIN: CPT | Mod: 25 | Performed by: PHYSICIAN ASSISTANT

## 2019-11-14 RX ORDER — PREDNISONE 10 MG/1
TABLET ORAL
Qty: 30 TAB | Refills: 0 | Status: SHIPPED | OUTPATIENT
Start: 2019-11-14 | End: 2019-11-26 | Stop reason: SDUPTHER

## 2019-11-15 ENCOUNTER — APPOINTMENT (OUTPATIENT)
Dept: RADIOLOGY | Facility: IMAGING CENTER | Age: 59
End: 2019-11-15
Attending: PHYSICIAN ASSISTANT
Payer: COMMERCIAL

## 2019-11-15 DIAGNOSIS — M79.671 BILATERAL FOOT PAIN: ICD-10-CM

## 2019-11-15 DIAGNOSIS — M79.672 BILATERAL FOOT PAIN: ICD-10-CM

## 2019-11-15 PROCEDURE — 77077 JOINT SURVEY SINGLE VIEW: CPT | Mod: TC | Performed by: PHYSICIAN ASSISTANT

## 2019-11-15 NOTE — PROGRESS NOTES
Subjective:   Shawn Duffy is a 59 y.o. male here today for hospital discharge follow-up for right-sided numbness; f/u other medical problems, foot pain. Is a new patient to me and is also establishing care today.    Previous PCP: Roberto Stokes MD    HPI:    Patient presents to the office today to establish care with me.  He is here to follow-up on recent hospitalization to [facility] from 11/3/2019 to 11/5/2019.  He presented with numbness of his right face, tongue, and hand.  On further work-up, was found to have occlusion of his basilar artery.  He was started on aspirin and Plavix as well as high-dose atorvastatin.  Lisinopril 5 mg daily was also added for blood pressure control.    He also has new complaint today for bilateral foot pain.  Is experiencing pain on the tops of both feet with associated swelling.  Symptoms initially started in late October.  Was seen at urgent care at that time and diagnosed with likely gout.  He states that he then had lab work done and was told that he likely did not have gout. He recalls previous joint aspiration of one of his knees by orthopedics with no visible crystals. Symptoms dramatically improved with prednisone that he was previously prescribed but then this past weekend, he noticed the onset of pain again.  He denies any history of trauma.    Other current medical problems include the following:    Hypertension  Lisinopril 5 mg daily was started during recent hospitalization. BP today in the office is 128/72.    Sleep apnea  Treated with nightly CPAP. Follows annually with Sleep Medicine.     Hemochromatosis  Endorses being heterozygous for hemochromatosis. Last iron levels in 1/2019.    Osteoarthritis of knees, bilateral  Has chronic arthritis in both knees. Already following with [facility] for this issue. Was supposed to be having left TKA but surgery was cancelled in light of recent hospitalization.    Chronic right-sided low back pain  "without sciatica  Follows with spine clinic for chronic low back pain. Has severe bilateral foraminal stenosis at L5-S1 with grade 1 spondylolisthesis per review. Is prescribed Norco which he takes on as-needed basis for severe pain.       Current medicines (including changes today)  Current Outpatient Medications   Medication Sig Dispense Refill   • predniSONE (DELTASONE) 10 MG Tab Take 3 tablets in the morning and 3 tablets in the evening for 5 days 30 Tab 0   • lisinopril (PRINIVIL) 5 MG Tab Take 1 Tab by mouth every day. 30 Tab 0   • atorvastatin (LIPITOR) 80 MG tablet Take 1 Tab by mouth every evening. 30 Tab 0   • aspirin (ASA) 81 MG Chew Tab chewable tablet Take 1 Tab by mouth every day. 90 Tab 0   • clopidogrel (PLAVIX) 75 MG Tab Take 1 Tab by mouth every day. 30 Tab 0   • amitriptyline (ELAVIL) 50 MG Tab TAKE 1 TABLET BY MOUTH EVERY DAY AT BEDTIME AS NEEDED 90 Tab 1     No current facility-administered medications for this visit.      He  has a past medical history of Eosinophilic esophagitis (6/22/2015), GERD (gastroesophageal reflux disease), Obesity, unspecified, Other chronic nonalcoholic liver disease, and Sleep apnea (12/22/2009).    ROS  As per HPI.       Objective:     /72 (BP Location: Left arm, Patient Position: Sitting, BP Cuff Size: Large adult)   Pulse (!) 104   Temp 36.3 °C (97.3 °F)   Ht 1.651 m (5' 5\")   Wt 120.7 kg (266 lb)   SpO2 96%  Body mass index is 44.26 kg/m².     Physical Exam:  Constitutional: Alert, no distress.  Skin: Warm, dry, good turgor, no rashes in visible areas.  Eye: Pupils are equal and round, conjunctiva clear, lids normal.  ENMT: Lips without lesions, moist mucus membranes.  Neck: No masses. No submandibular or cervical lymphadenopathy.  Respiratory: Unlabored respiratory effort, lungs clear to auscultation, no wheezes, no rhonchi.  Cardiovascular: Normal S1, S2, no murmur.  Extremities: Focused exam performed to the bilateral lower extremities. There is 1+ " pitting edema to bilateral feet/ankles/distal lower legs. Tender over left proximal 5th metatarsal and right 1st MTP and proximal 5th metatarsal areas.       Assessment and Plan:   The following treatment plan was discussed    1. Hospital discharge follow-up  Hospital records reviewed including provider notes, lab results, imaging results. Recommendations as per below.    2. Basilar artery occlusion  New problem, uncontrolled. Patient has had resolution of previous numbness and is now on appropriate medications to include aspirin, Plavix, and high-dose statin. Recommend he follow up with neurology. It does not appear a referral has been placed set, so I have ordered this.  - REFERRAL TO NEUROLOGY    3. Bilateral foot pain  New problem, uncontrolled. Reviewed recent UC note. Patient was prescribed short course of prednisone with significant symptomatic improvement. Uric acid level came back normal. Gout is still possibility, but would like to obtain x-rays for further evaluation and have him follow-up with podiatry. Given that he is having significant pain/swelling again, will given another 5 days of prednisone.  - DX-JOINT SURVEY-FEET SINGLE VIEW; Future  - REFERRAL TO PODIATRY  - predniSONE (DELTASONE) 10 MG Tab; Take 3 tablets in the morning and 3 tablets in the evening for 5 days  Dispense: 30 Tab; Refill: 0    4. Essential hypertension  New problem, well-controlled since addition of low-dose lisinopril. BP today looks fine. Continue current management.    5. Obstructive sleep apnea syndrome  Established problem, well-controlled with nightly CPAP. Will continue to follow with sleep apnea.    6. Morbid obesity with BMI of 40.0-44.9, adult (HCC)  Established problem, uncontrolled but patient states he is trying to lose weight. Has been making some improvements to his diet. Will continue to monitor.    7. Hereditary hemochromatosis (HCC)  Established problem, stable. Will plan to periodically monitor iron levels.  Last labs from January reviewed.    8. Primary osteoarthritis of both knees  Established problem, uncontrolled. Will eventually be having knee replacement but this is on hold given his basilar artery occlusion. Needs to be on anti-platelet therapy for minimum of 90 days, possibly longer. Will defer to neurology's recommendation regarding this.    9. Chronic right-sided low back pain without sciatica  Established problem, well-controlled with PRN [place]. He will continue to follow with spine clinic.    10. Need for vaccination  - Influenza Vaccine Quad Injection (PF)      Followup: Return in about 3 months (around 2/14/2020).    Shilpa Roth P.A.-C.

## 2019-11-15 NOTE — ASSESSMENT & PLAN NOTE
Lisinopril 5 mg daily was started during recent hospitalization. BP today in the office is 128/72.

## 2019-11-18 NOTE — ASSESSMENT & PLAN NOTE
Has chronic arthritis in both knees. Already following with [facility] for this issue. Was supposed to be having left TKA but surgery was cancelled in light of recent hospitalization.

## 2019-11-18 NOTE — ASSESSMENT & PLAN NOTE
Follows with spine clinic for chronic low back pain. Has severe bilateral foraminal stenosis at L5-S1 with grade 1 spondylolisthesis per review. Is prescribed Norco which he takes on as-needed basis for severe pain.

## 2019-11-19 ENCOUNTER — TELEPHONE (OUTPATIENT)
Dept: MEDICAL GROUP | Facility: PHYSICIAN GROUP | Age: 59
End: 2019-11-19

## 2019-11-20 NOTE — TELEPHONE ENCOUNTER
Was the patient seen in the last year in this department? Yes    Does patient have an active prescription for medications requested? No     Received Request Via: Pharmacy      Pt met protocol?: Yes, labs 11/5/19 ov 11/14/19 bp 128/72   PT 12.0 - 14.6 sec 13.1    INR 0.87 - 1.13 0.97

## 2019-11-21 RX ORDER — CLOPIDOGREL BISULFATE 75 MG/1
75 TABLET ORAL DAILY
Qty: 90 TAB | Refills: 3 | Status: SHIPPED | OUTPATIENT
Start: 2019-11-21 | End: 2020-06-10 | Stop reason: SDUPTHER

## 2019-11-21 RX ORDER — LISINOPRIL 5 MG/1
5 TABLET ORAL DAILY
Qty: 90 TAB | Refills: 1 | Status: SHIPPED | OUTPATIENT
Start: 2019-11-21 | End: 2020-02-18

## 2019-11-21 RX ORDER — ATORVASTATIN CALCIUM 80 MG/1
80 TABLET, FILM COATED ORAL EVERY EVENING
Qty: 90 TAB | Refills: 3 | Status: SHIPPED | OUTPATIENT
Start: 2019-11-21 | End: 2020-06-10 | Stop reason: SDUPTHER

## 2019-11-21 NOTE — TELEPHONE ENCOUNTER
Pt has had OV within the 12 month protocol and lipid panel is current. 12 month supply sent to pharmacy of chol and 6 months on BP med.   Lab Results   Component Value Date/Time    CHOLSTRLTOT 123 11/05/2019 04:52 AM    LDL 68 11/05/2019 04:52 AM    HDL 39 (A) 11/05/2019 04:52 AM    TRIGLYCERIDE 81 11/05/2019 04:52 AM       Lab Results   Component Value Date/Time    SODIUM 138 11/05/2019 04:52 AM    POTASSIUM 3.9 11/05/2019 04:52 AM    CHLORIDE 104 11/05/2019 04:52 AM    CO2 28 11/05/2019 04:52 AM    GLUCOSE 113 (H) 11/05/2019 04:52 AM    BUN 16 11/05/2019 04:52 AM    CREATININE 0.86 11/05/2019 04:52 AM    CREATININE 0.9 04/14/2009 11:43 AM     Lab Results   Component Value Date/Time    ALKPHOSPHAT 88 11/03/2019 11:57 PM    ASTSGOT 34 11/03/2019 11:57 PM    ALTSGPT 62 (H) 11/03/2019 11:57 PM    TBILIRUBIN 0.7 11/03/2019 11:57 PM

## 2019-11-22 DIAGNOSIS — M79.671 BILATERAL FOOT PAIN: ICD-10-CM

## 2019-11-22 DIAGNOSIS — M79.672 BILATERAL FOOT PAIN: ICD-10-CM

## 2019-11-22 NOTE — TELEPHONE ENCOUNTER
Pt has an appointment on 12/6/19 with podiatrist.    Was the patient seen in the last year in this department? Yes    Does patient have an active prescription for medications requested? No     Received Request Via: Patient

## 2019-11-26 RX ORDER — PREDNISONE 10 MG/1
TABLET ORAL
Qty: 20 TAB | Refills: 0 | Status: SHIPPED | OUTPATIENT
Start: 2019-11-26 | End: 2020-01-14 | Stop reason: SDUPTHER

## 2019-11-26 NOTE — TELEPHONE ENCOUNTER
Please inform patient that I have refilled the steroids. I am cutting down dose slightly to 2 tablets BID. No further refills until he sees podiatry.  Shilpa Roth P.A.-C.

## 2019-12-12 ENCOUNTER — TELEPHONE (OUTPATIENT)
Dept: MEDICAL GROUP | Facility: PHYSICIAN GROUP | Age: 59
End: 2019-12-12

## 2019-12-12 DIAGNOSIS — M19.079 LOCALIZED, PRIMARY OSTEOARTHRITIS OF ANKLE OR FOOT, UNSPECIFIED LATERALITY: ICD-10-CM

## 2019-12-12 NOTE — TELEPHONE ENCOUNTER
1. Caller Name: Shawn Duffy                                           Call Back Number: [phone] (home)         Patient approves a detailed voicemail message: N\A    Pt called stating he seen [facility] for foot pain but the provider referred him back to [facility] for foot injections however they are unable to get him in and state they have 400 people ahead of him. Pt is requesting more Prednisone to help him with his foot pain until he is able to get into another office for injections. Please advise.

## 2019-12-13 NOTE — TELEPHONE ENCOUNTER
Can you please request records from [facility]? Will need to review prior to further refills.  Shilpa Roth P.A.-C.

## 2019-12-16 NOTE — TELEPHONE ENCOUNTER
Pt called back and I advised him we are waiting for records he is more than welcome to bring a copy of the OV notes to us to help speed up the process.

## 2019-12-20 PROBLEM — M19.079 LOCALIZED, PRIMARY OSTEOARTHRITIS OF ANKLE OR FOOT: Status: ACTIVE | Noted: 2019-12-20

## 2019-12-20 NOTE — TELEPHONE ENCOUNTER
Please contact patient and let him know that I reviewed ortho records. He has bad foot arthritis which is causing his foot pain/swelling. We verified with [facility] that he will need to have injections at [facility]. I understand that there is a long wait, but I don't want to keep him on prednisone that long due to risks of long-term steroids. I am prescribing him an anti-inflammatory gel to start using on his feet which I'm hoping will help to control his pain until he's able to get in for the injections. It is called diclofenac gel and he can use 4 times daily as-needed.  Shilpa Roth P.A.-C.

## 2019-12-20 NOTE — TELEPHONE ENCOUNTER
Phone Number Called: Shawn Duffy      Call outcome: left message for patient to call back regarding message below    Message: left vm

## 2020-01-13 ENCOUNTER — TELEPHONE (OUTPATIENT)
Dept: MEDICAL GROUP | Facility: PHYSICIAN GROUP | Age: 60
End: 2020-01-13

## 2020-01-13 DIAGNOSIS — M19.079 LOCALIZED, PRIMARY OSTEOARTHRITIS OF ANKLE OR FOOT, UNSPECIFIED LATERALITY: ICD-10-CM

## 2020-01-13 DIAGNOSIS — M79.671 BILATERAL FOOT PAIN: ICD-10-CM

## 2020-01-13 DIAGNOSIS — M79.672 BILATERAL FOOT PAIN: ICD-10-CM

## 2020-01-14 DIAGNOSIS — M79.671 BILATERAL FOOT PAIN: ICD-10-CM

## 2020-01-14 DIAGNOSIS — M79.672 BILATERAL FOOT PAIN: ICD-10-CM

## 2020-01-14 RX ORDER — PREDNISONE 10 MG/1
TABLET ORAL
Qty: 20 TAB | Refills: 0 | Status: CANCELLED | OUTPATIENT
Start: 2020-01-14

## 2020-01-14 RX ORDER — PREDNISONE 10 MG/1
TABLET ORAL
Qty: 20 TAB | Refills: 0 | Status: SHIPPED | OUTPATIENT
Start: 2020-01-14 | End: 2020-04-07

## 2020-01-14 NOTE — TELEPHONE ENCOUNTER
Pt called asking for a prednisone prescription, his feet are in a lot of pain, and that the gel you prescribed isnt helping..    Please advise

## 2020-01-14 NOTE — TELEPHONE ENCOUNTER
Pt called the office back. Pt notified of medication refill and he states he is scheduled to have foot injections on the 1/31/2020.

## 2020-01-14 NOTE — TELEPHONE ENCOUNTER
Was the patient seen in the last year in this department? Yes    Does patient have an active prescription for medications requested? Yes    Received Request Via: Patient     Patient called to request prednisone as they are having the same issue with their feet and stated that the prednisone worked last time and would like to use it again.

## 2020-01-14 NOTE — TELEPHONE ENCOUNTER
I have refilled. Has he gotten update as to when he'll be able to get the foot injections done?  Shilpa Roth P.A.-C.

## 2020-01-31 ENCOUNTER — HOSPITAL ENCOUNTER (OUTPATIENT)
Dept: RADIOLOGY | Facility: MEDICAL CENTER | Age: 60
End: 2020-01-31
Attending: NURSE PRACTITIONER
Payer: COMMERCIAL

## 2020-01-31 DIAGNOSIS — M79.672 LEFT FOOT PAIN: ICD-10-CM

## 2020-01-31 DIAGNOSIS — M12.872 TRANSIENT ARTHROPATHY INVOLVING ANKLE AND FOOT, LEFT: ICD-10-CM

## 2020-01-31 PROCEDURE — 700117 HCHG RX CONTRAST REV CODE 255: Performed by: NURSE PRACTITIONER

## 2020-01-31 PROCEDURE — 77002 NEEDLE LOCALIZATION BY XRAY: CPT

## 2020-01-31 PROCEDURE — 20600 DRAIN/INJ JOINT/BURSA W/O US: CPT

## 2020-01-31 RX ORDER — BUPIVACAINE HYDROCHLORIDE 5 MG/ML
INJECTION, SOLUTION EPIDURAL; INTRACAUDAL
Status: DISPENSED
Start: 2020-01-31 | End: 2020-01-31

## 2020-01-31 RX ORDER — TRIAMCINOLONE ACETONIDE 40 MG/ML
INJECTION, SUSPENSION INTRA-ARTICULAR; INTRAMUSCULAR
Status: DISPENSED
Start: 2020-01-31 | End: 2020-01-31

## 2020-01-31 RX ORDER — LIDOCAINE HYDROCHLORIDE 10 MG/ML
INJECTION, SOLUTION INFILTRATION; PERINEURAL
Status: DISPENSED
Start: 2020-01-31 | End: 2020-01-31

## 2020-01-31 RX ADMIN — IOHEXOL 1 ML: 300 INJECTION, SOLUTION INTRAVENOUS at 09:55

## 2020-01-31 NOTE — PROCEDURES
Immediate Post- Operative Note    PostOp Diagnosis: OA    Procedure(s): 3rd and 4th lt TMT injections    Estimated Blood Loss: Less than 5 ml    Complications: None    1/31/2020     11:31 AM     [place] M.D.

## 2020-02-18 ENCOUNTER — OFFICE VISIT (OUTPATIENT)
Dept: MEDICAL GROUP | Facility: PHYSICIAN GROUP | Age: 60
End: 2020-02-18
Payer: COMMERCIAL

## 2020-02-18 VITALS
HEART RATE: 93 BPM | BODY MASS INDEX: 43.32 KG/M2 | TEMPERATURE: 97.7 F | WEIGHT: 260 LBS | OXYGEN SATURATION: 97 % | HEIGHT: 65 IN | DIASTOLIC BLOOD PRESSURE: 62 MMHG | SYSTOLIC BLOOD PRESSURE: 120 MMHG

## 2020-02-18 DIAGNOSIS — M19.079 LOCALIZED, PRIMARY OSTEOARTHRITIS OF ANKLE OR FOOT, UNSPECIFIED LATERALITY: ICD-10-CM

## 2020-02-18 DIAGNOSIS — F51.01 PRIMARY INSOMNIA: ICD-10-CM

## 2020-02-18 DIAGNOSIS — I10 ESSENTIAL HYPERTENSION: ICD-10-CM

## 2020-02-18 DIAGNOSIS — I65.1 BASILAR ARTERY OCCLUSION: ICD-10-CM

## 2020-02-18 PROCEDURE — 99214 OFFICE O/P EST MOD 30 MIN: CPT | Performed by: PHYSICIAN ASSISTANT

## 2020-02-18 RX ORDER — LISINOPRIL 10 MG/1
10 TABLET ORAL DAILY
Qty: 90 TAB | Refills: 1 | Status: SHIPPED | OUTPATIENT
Start: 2020-02-18 | End: 2020-06-01 | Stop reason: SDUPTHER

## 2020-02-18 RX ORDER — LORATADINE 10 MG
TABLET ORAL
COMMUNITY
Start: 2019-12-30 | End: 2020-02-18

## 2020-02-18 RX ORDER — ERGOCALCIFEROL 1.25 MG/1
CAPSULE ORAL
COMMUNITY
End: 2020-02-18

## 2020-02-18 RX ORDER — DOXEPIN HYDROCHLORIDE 10 MG/1
10 CAPSULE ORAL
Qty: 30 CAP | Refills: 3 | Status: SHIPPED | OUTPATIENT
Start: 2020-02-18 | End: 2020-04-07

## 2020-02-18 ASSESSMENT — PATIENT HEALTH QUESTIONNAIRE - PHQ9: CLINICAL INTERPRETATION OF PHQ2 SCORE: 0

## 2020-02-18 NOTE — PROGRESS NOTES
Subjective:   Shawn Duffy is a 59 y.o. male here today for follow-up on hypertension, foot pain, basilar artery occlusion. Is an established patient of mine.    HPI:    Patient presents to the office today for routine follow-up. 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 He is now on Plavix. Has not had any recurrence of the paresthesias. I placed referral to neurology which has not yet been scheduled. He was started on low-dose lisinopril during the course of the hospitalization at dose of 5 mg daily. States that since last visit, readings have been consistently high at home, >140 systolic, so he increased his dose to 2 tablets (10 mg) daily about 2 weeks ago. BP today in the office is 120/62. Prior to dose increase was experiencing head pain and intermittent dizzy spells. Those things have since subsided.    Patient was also having a significant amount of bilateral foot pain and swelling at last office visit. Was found to have arthritis and subsequently followed up with podiatry who recommended steroid injections. These were recently done last month. Only had left foot done as right foot pain resolved beforehand. Prior to procedure, patient had been on multiple rounds of prednisone to control his pain.     Patient is requesting to restart Ambien.  He has chronic insomnia and was historically treated with Ambien but states his previous PCP wanted to try him on an alternative, which is when he was started on amitriptyline.  He is currently taking 50 mg dose.  On several occasions, he has doubled this.  Estimates that he has been using the medication now for the last 3 to 4 months and has not noticed any significant improvement in his sleep.  His issue is primarily with sleep onset insomnia.  It often takes him 4 hours or more to fall asleep.  Once he is asleep, he does typically stay asleep.  He does have  "known sleep apnea and uses CPAP.  He endorses compliance with use every night.  He is also compliant with regular sleep medicine evaluations.  His last follow-up was in December and he denies any change to his settings.  He has started using marijuana Gummies in addition to the amitriptyline to help him sleep which are helpful.  He recalls trying trazodone years ago but it gave him what he describes as a \"medication hangover\" the following day.       Current medicines (including changes today)  Current Outpatient Medications   Medication Sig Dispense Refill   • lisinopril (PRINIVIL) 10 MG Tab Take 1 Tab by mouth every day. 90 Tab 1   • doxepin (SINEQUAN) 10 MG Cap Take 1 Cap by mouth every bedtime. For sleep. 30 Cap 3   • predniSONE (DELTASONE) 10 MG Tab Take 2 tablets in the morning and 2 tablets in the evening for 5 days 20 Tab 0   • Diclofenac Sodium 1 % Gel Apply up to 3 g to bilateral feet QID as-needed for pain/swelling. 1 Tube 2   • atorvastatin (LIPITOR) 80 MG tablet Take 1 Tab by mouth every evening. 90 Tab 3   • clopidogrel (PLAVIX) 75 MG Tab Take 1 Tab by mouth every day. 90 Tab 3   • aspirin (ASA) 81 MG Chew Tab chewable tablet Take 1 Tab by mouth every day. 90 Tab 0     No current facility-administered medications for this visit.      He  has a past medical history of Eosinophilic esophagitis (6/22/2015), GERD (gastroesophageal reflux disease), Obesity, unspecified, Other chronic nonalcoholic liver disease, and Sleep apnea (12/22/2009).    ROS  Pertinent ROS as per HPI  No chest pain  No SOB       Objective:     /62 (BP Location: Right arm, Patient Position: Sitting, BP Cuff Size: Adult)   Pulse 93   Temp 36.5 °C (97.7 °F) (Tympanic)   Ht 1.651 m (5' 5\")   Wt 117.9 kg (260 lb)   SpO2 97%  Body mass index is 43.27 kg/m².     Physical Exam:  Constitutional: Alert, obese but otherwise well-appearing, very pleasant, no distress.  Skin: No rashes in visible areas.  Eye: Pupils are equal and round, " conjunctiva clear, lids normal.  ENMT: Lips without lesions, moist mucus membranes.  Neck: Normal-appearing.  Respiratory: Unlabored respiratory effort, lungs clear to auscultation, no wheezes, no rhonchi.  Cardiovascular: Normal S1, S2, no murmur.      Assessment and Plan:   The following treatment plan was discussed    1. Basilar artery occlusion  Established problem, stable. Compliant with Plavix. We discussed importance of neurology follow-up. He was provided with scheduling number.    2. Essential hypertension  Established problem, well-controlled since increase in lisinopril dose to 10 mg daily. I have refilled this new dose for him. Continue current management.  - lisinopril (PRINIVIL) 10 MG Tab; Take 1 Tab by mouth every day.  Dispense: 90 Tab; Refill: 1    3. Localized, primary osteoarthritis of ankle or foot, unspecified laterality  Established problem, well-controlled since recent steroid injection. He will follow up with podiatry PRN if pain returns. States was told he can have injections every 6 months as-needed. He does still have a tube of diclofenac gel that I prescribed that he can use as-needed.    4. Primary insomnia  Established problem, uncontrolled. Does not find amitriptyline helpful, even at higher doses. We discussed that long-term use of Ambien is certainly not ideal and should be last resort. Will avoid trazodone given previous side effects and trial on low-dose doxepin. Will re-evaluate symptoms at next appointment.  - doxepin (SINEQUAN) 10 MG Cap; Take 1 Cap by mouth every bedtime. For sleep.  Dispense: 30 Cap; Refill: 3      Followup: Return in about 6 months (around 8/18/2020) for f/u HTN, insomnia; Short.    Shilpa Roth P.A.-C.

## 2020-03-13 DIAGNOSIS — M19.079 LOCALIZED, PRIMARY OSTEOARTHRITIS OF ANKLE OR FOOT, UNSPECIFIED LATERALITY: ICD-10-CM

## 2020-03-25 RX ORDER — AMITRIPTYLINE HYDROCHLORIDE 50 MG/1
TABLET, FILM COATED ORAL
Qty: 30 TAB | Refills: 5 | OUTPATIENT
Start: 2020-03-25

## 2020-04-07 ENCOUNTER — OFFICE VISIT (OUTPATIENT)
Dept: MEDICAL GROUP | Facility: PHYSICIAN GROUP | Age: 60
End: 2020-04-07
Payer: COMMERCIAL

## 2020-04-07 ENCOUNTER — HOSPITAL ENCOUNTER (OUTPATIENT)
Dept: LAB | Facility: MEDICAL CENTER | Age: 60
End: 2020-04-07
Attending: FAMILY MEDICINE
Payer: COMMERCIAL

## 2020-04-07 VITALS
HEIGHT: 65 IN | TEMPERATURE: 98.3 F | OXYGEN SATURATION: 97 % | DIASTOLIC BLOOD PRESSURE: 80 MMHG | WEIGHT: 270.7 LBS | HEART RATE: 87 BPM | BODY MASS INDEX: 45.1 KG/M2 | SYSTOLIC BLOOD PRESSURE: 138 MMHG

## 2020-04-07 DIAGNOSIS — M17.0 PRIMARY OSTEOARTHRITIS OF BOTH KNEES: ICD-10-CM

## 2020-04-07 DIAGNOSIS — E83.110 HEREDITARY HEMOCHROMATOSIS (HCC): ICD-10-CM

## 2020-04-07 DIAGNOSIS — G89.29 CHRONIC RIGHT-SIDED LOW BACK PAIN WITHOUT SCIATICA: ICD-10-CM

## 2020-04-07 DIAGNOSIS — Z11.59 NEED FOR HEPATITIS C SCREENING TEST: ICD-10-CM

## 2020-04-07 DIAGNOSIS — Z12.11 SCREENING FOR COLORECTAL CANCER: ICD-10-CM

## 2020-04-07 DIAGNOSIS — M54.50 CHRONIC RIGHT-SIDED LOW BACK PAIN WITHOUT SCIATICA: ICD-10-CM

## 2020-04-07 DIAGNOSIS — I65.1 BASILAR ARTERY OCCLUSION: ICD-10-CM

## 2020-04-07 DIAGNOSIS — Z12.12 SCREENING FOR COLORECTAL CANCER: ICD-10-CM

## 2020-04-07 LAB
FERRITIN SERPL-MCNC: 203 NG/ML (ref 22–322)
HCV AB SER QL: NORMAL
IRON SATN MFR SERPL: 42 % (ref 15–55)
IRON SERPL-MCNC: 125 UG/DL (ref 50–180)
TIBC SERPL-MCNC: 297 UG/DL (ref 250–450)
UIBC SERPL-MCNC: 172 UG/DL (ref 110–370)

## 2020-04-07 PROCEDURE — 36415 COLL VENOUS BLD VENIPUNCTURE: CPT

## 2020-04-07 PROCEDURE — 83550 IRON BINDING TEST: CPT

## 2020-04-07 PROCEDURE — G0472 HEP C SCREEN HIGH RISK/OTHER: HCPCS

## 2020-04-07 PROCEDURE — 82728 ASSAY OF FERRITIN: CPT

## 2020-04-07 PROCEDURE — 99214 OFFICE O/P EST MOD 30 MIN: CPT | Performed by: FAMILY MEDICINE

## 2020-04-07 PROCEDURE — 83540 ASSAY OF IRON: CPT

## 2020-04-07 RX ORDER — AMITRIPTYLINE HYDROCHLORIDE 50 MG/1
50 TABLET, FILM COATED ORAL
Qty: 90 TAB | Refills: 3 | Status: SHIPPED | OUTPATIENT
Start: 2020-04-07 | End: 2021-02-23 | Stop reason: SDUPTHER

## 2020-04-07 RX ORDER — AMITRIPTYLINE HYDROCHLORIDE 50 MG/1
TABLET, FILM COATED ORAL
COMMUNITY
Start: 2020-02-19 | End: 2020-04-07 | Stop reason: SDUPTHER

## 2020-04-07 ASSESSMENT — FIBROSIS 4 INDEX: FIB4 SCORE: 1.02

## 2020-04-07 NOTE — PROGRESS NOTES
"cc: Chronic pain      Subjective:     Shawn Duffy is a 59 y.o. male presenting for the following:     Patient was planning on have knee replacements and a back surgery but then had a stroke. So these surgeries have been delayed until he is stable and has been released by neurology. He does not have any residual weakness from the stroke.  But he does have chronic pain that is severe in both knees and in his lower back.  He is unable to walk for more than 30 feet without stopping to rest due to the pain.  He does usually use a cane or walker.    Review of systems:  All others reviewed and are negative.       Current Outpatient Medications:   •  amitriptyline (ELAVIL) 50 MG Tab, Take 1 Tab by mouth at bedtime as needed for Sleep., Disp: 90 Tab, Rfl: 3  •  Diclofenac Sodium 1 % Gel, APPLY UP TO 3 GRAMS TO BILATERAL FEET FOUR TIMES DAILY AS-NEEDED FOR PAIN/SWELLING., Disp: 100 g, Rfl: 2  •  lisinopril (PRINIVIL) 10 MG Tab, Take 1 Tab by mouth every day., Disp: 90 Tab, Rfl: 1  •  atorvastatin (LIPITOR) 80 MG tablet, Take 1 Tab by mouth every evening., Disp: 90 Tab, Rfl: 3  •  clopidogrel (PLAVIX) 75 MG Tab, Take 1 Tab by mouth every day., Disp: 90 Tab, Rfl: 3  •  aspirin (ASA) 81 MG Chew Tab chewable tablet, Take 1 Tab by mouth every day., Disp: 90 Tab, Rfl: 0    Allergies, past medical history, past surgical history, family history, social history reviewed and updated    Objective:     Vitals: /80 (BP Location: Right arm, Patient Position: Sitting, BP Cuff Size: Adult)   Pulse 87   Temp 36.8 °C (98.3 °F) (Temporal)   Ht 1.651 m (5' 5\")   Wt 122.8 kg (270 lb 11.2 oz)   SpO2 97%   BMI 45.05 kg/m²   General: Alert, pleasant, NAD  HEENT: Normocephalic.   EOMI, no icterus or pallor.    Heart: Regular rate and rhythm.  S1 and S2 normal.  No murmurs appreciated.  Respiratory: Normal respiratory effort.    Musculoskeletal: Gait is slow and patient with grimace when sitting or standing.  Extremities: No leg " edema.    Psych:  Affect is normal, judgement is good, grooming is appropriate.    Assessment/Plan:     Shawn was seen today for paperwork.    Diagnoses and all orders for this visit:    Primary osteoarthritis of both knees  Chronic right-sided low back pain without sciatica  Patient is hoping to have some knee replacements that may help with this pain but the surgeries have been delayed.  So as patient is unable to walk for more than about 30 feet without the pain becoming severe, DMV placard form filled out today.    Basilar artery occlusion: Some residual right face numbness.  Is taking Plavix, aspirin, atorvastatin daily.    Hereditary hemochromatosis (HCC): Patient heterozygous.  Iron last checked more than a year ago, will recheck to ensure no severe iron overload.  CMP recently done and was showing normal hepatic function.  -     IRON/TOTAL IRON BIND; Future  -     FERRITIN; Future    Need for hepatitis C screening test  -     HCV Scrn ( [phone] 1xLife); Future    Screening for colorectal cancer   -     REFERRAL TO GI FOR COLONOSCOPY    Other orders: Refill of medication that has been somewhat helpful for his insomnia.  -     amitriptyline (ELAVIL) 50 MG Tab; Take 1 Tab by mouth at bedtime as needed for Sleep.      Return in about 3 months (around 2020), or if symptoms worsen or fail to improve.

## 2020-05-23 RX ORDER — ATORVASTATIN CALCIUM 80 MG/1
TABLET, FILM COATED ORAL
Qty: 30 TAB | Refills: 11 | OUTPATIENT
Start: 2020-05-23

## 2020-05-23 RX ORDER — CLOPIDOGREL BISULFATE 75 MG/1
TABLET ORAL
Qty: 30 TAB | Refills: 11 | OUTPATIENT
Start: 2020-05-23

## 2020-05-28 ENCOUNTER — OFFICE VISIT (OUTPATIENT)
Dept: NEUROLOGY | Facility: MEDICAL CENTER | Age: 60
End: 2020-05-28
Payer: COMMERCIAL

## 2020-05-28 VITALS
TEMPERATURE: 97.3 F | DIASTOLIC BLOOD PRESSURE: 74 MMHG | RESPIRATION RATE: 46 BRPM | WEIGHT: 273.37 LBS | OXYGEN SATURATION: 97 % | HEART RATE: 95 BPM | BODY MASS INDEX: 45.55 KG/M2 | SYSTOLIC BLOOD PRESSURE: 120 MMHG | HEIGHT: 65 IN

## 2020-05-28 DIAGNOSIS — R20.0 PERIORAL NUMBNESS: ICD-10-CM

## 2020-05-28 DIAGNOSIS — I65.1 BASILAR ARTERY STENOSIS: ICD-10-CM

## 2020-05-28 PROCEDURE — 99203 OFFICE O/P NEW LOW 30 MIN: CPT

## 2020-05-28 ASSESSMENT — FIBROSIS 4 INDEX: FIB4 SCORE: 1.04
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
## 2020-06-01 DIAGNOSIS — I10 ESSENTIAL HYPERTENSION: ICD-10-CM

## 2020-06-01 NOTE — TELEPHONE ENCOUNTER
Patient Seen: 4/7/2020 With Dr. Agudelo  Next Appointment: 08/17/2020 With Shilpa Roth PA-C  Was the patient seen in the last year in this department? Yes     Does patient have an active prescription for medications requested? No     Received Request Via: Patient

## 2020-06-04 RX ORDER — ATORVASTATIN CALCIUM 80 MG/1
80 TABLET, FILM COATED ORAL EVERY EVENING
Qty: 90 TAB | OUTPATIENT
Start: 2020-06-04

## 2020-06-04 RX ORDER — CLOPIDOGREL BISULFATE 75 MG/1
75 TABLET ORAL DAILY
Qty: 90 TAB | OUTPATIENT
Start: 2020-06-04

## 2020-06-04 RX ORDER — LISINOPRIL 10 MG/1
10 TABLET ORAL DAILY
Qty: 90 TAB | Refills: 1 | Status: SHIPPED | OUTPATIENT
Start: 2020-06-04 | End: 2020-09-22 | Stop reason: SDUPTHER

## 2020-06-04 NOTE — TELEPHONE ENCOUNTER
Atorvastatin and clopidogrel are too soon, year supply sent back in November.    Last seen by PCP 04/7/2020. Will send 6 month(s) to the pharmacy.  Last Blood Pressure reading was 120/74 on 5/28/2020

## 2020-06-08 NOTE — TELEPHONE ENCOUNTER
Patient Seen: 4/7/2020 With Dr. Agudelo  Next Appointment: 8/17/2020 With Shilpa Roth PA-C  Was the patient seen in the last year in this department? Yes     Does patient have an active prescription for medications requested? No     Received Request Via: Patient     Please advise, pt is currently out.

## 2020-06-10 RX ORDER — ATORVASTATIN CALCIUM 80 MG/1
80 TABLET, FILM COATED ORAL EVERY EVENING
Qty: 90 TAB | Refills: 3 | Status: SHIPPED | OUTPATIENT
Start: 2020-06-10 | End: 2021-02-23 | Stop reason: SDUPTHER

## 2020-06-10 RX ORDER — CLOPIDOGREL BISULFATE 75 MG/1
75 TABLET ORAL DAILY
Qty: 90 TAB | Refills: 3 | Status: SHIPPED | OUTPATIENT
Start: 2020-06-10 | End: 2021-02-23 | Stop reason: SDUPTHER

## 2020-06-22 ENCOUNTER — OFFICE VISIT (OUTPATIENT)
Dept: ADMISSIONS | Facility: MEDICAL CENTER | Age: 60
End: 2020-06-22
Attending: ORTHOPAEDIC SURGERY
Payer: COMMERCIAL

## 2020-06-22 DIAGNOSIS — Z01.810 PRE-OPERATIVE CARDIOVASCULAR EXAMINATION: ICD-10-CM

## 2020-06-22 DIAGNOSIS — Z01.812 PRE-OPERATIVE LABORATORY EXAMINATION: ICD-10-CM

## 2020-06-22 LAB
ALBUMIN SERPL BCP-MCNC: 4.2 G/DL (ref 3.2–4.9)
ALBUMIN/GLOB SERPL: 1.2 G/DL
ALP SERPL-CCNC: 111 U/L (ref 30–99)
ALT SERPL-CCNC: 20 U/L (ref 2–50)
ANION GAP SERPL CALC-SCNC: 10 MMOL/L (ref 7–16)
AST SERPL-CCNC: 17 U/L (ref 12–45)
BILIRUB SERPL-MCNC: 0.9 MG/DL (ref 0.1–1.5)
BUN SERPL-MCNC: 14 MG/DL (ref 8–22)
CALCIUM SERPL-MCNC: 9.4 MG/DL (ref 8.4–10.2)
CHLORIDE SERPL-SCNC: 107 MMOL/L (ref 96–112)
CO2 SERPL-SCNC: 26 MMOL/L (ref 20–33)
COVID ORDER STATUS COVID19: NORMAL
CREAT SERPL-MCNC: 0.78 MG/DL (ref 0.5–1.4)
EKG IMPRESSION: NORMAL
ERYTHROCYTE [DISTWIDTH] IN BLOOD BY AUTOMATED COUNT: 46.4 FL (ref 35.9–50)
GLOBULIN SER CALC-MCNC: 3.4 G/DL (ref 1.9–3.5)
GLUCOSE SERPL-MCNC: 103 MG/DL (ref 65–99)
HCT VFR BLD AUTO: 43.4 % (ref 42–52)
HGB BLD-MCNC: 14.6 G/DL (ref 14–18)
MCH RBC QN AUTO: 32.2 PG (ref 27–33)
MCHC RBC AUTO-ENTMCNC: 33.6 G/DL (ref 33.7–35.3)
MCV RBC AUTO: 95.6 FL (ref 81.4–97.8)
PLATELET # BLD AUTO: 200 K/UL (ref 164–446)
PMV BLD AUTO: 10 FL (ref 9–12.9)
POTASSIUM SERPL-SCNC: 4.3 MMOL/L (ref 3.6–5.5)
PROT SERPL-MCNC: 7.6 G/DL (ref 6–8.2)
RBC # BLD AUTO: 4.54 M/UL (ref 4.7–6.1)
SARS-COV-2 RNA RESP QL NAA+PROBE: NOTDETECTED
SODIUM SERPL-SCNC: 143 MMOL/L (ref 135–145)
SPECIMEN SOURCE: NORMAL
WBC # BLD AUTO: 6.7 K/UL (ref 4.8–10.8)

## 2020-06-22 PROCEDURE — 93005 ELECTROCARDIOGRAM TRACING: CPT

## 2020-06-22 PROCEDURE — 87640 STAPH A DNA AMP PROBE: CPT

## 2020-06-22 PROCEDURE — 93010 ELECTROCARDIOGRAM REPORT: CPT | Performed by: INTERNAL MEDICINE

## 2020-06-22 PROCEDURE — 80053 COMPREHEN METABOLIC PANEL: CPT

## 2020-06-22 PROCEDURE — C9803 HOPD COVID-19 SPEC COLLECT: HCPCS

## 2020-06-22 PROCEDURE — 85027 COMPLETE CBC AUTOMATED: CPT

## 2020-06-22 PROCEDURE — 87641 MR-STAPH DNA AMP PROBE: CPT

## 2020-06-22 PROCEDURE — 36415 COLL VENOUS BLD VENIPUNCTURE: CPT

## 2020-06-22 PROCEDURE — U0003 INFECTIOUS AGENT DETECTION BY NUCLEIC ACID (DNA OR RNA); SEVERE ACUTE RESPIRATORY SYNDROME CORONAVIRUS 2 (SARS-COV-2) (CORONAVIRUS DISEASE [COVID-19]), AMPLIFIED PROBE TECHNIQUE, MAKING USE OF HIGH THROUGHPUT TECHNOLOGIES AS DESCRIBED BY CMS-2020-01-R: HCPCS

## 2020-06-22 RX ORDER — IBUPROFEN 200 MG
200 TABLET ORAL EVERY 6 HOURS PRN
Status: ON HOLD | COMMUNITY
End: 2020-06-27

## 2020-06-22 RX ORDER — HYDROCODONE BITARTRATE AND ACETAMINOPHEN 5; 325 MG/1; MG/1
1 TABLET ORAL EVERY 4 HOURS PRN
COMMUNITY
End: 2020-08-17

## 2020-06-22 RX ORDER — ACETAMINOPHEN 500 MG
500-1000 TABLET ORAL EVERY 6 HOURS PRN
COMMUNITY
End: 2021-02-23

## 2020-06-22 ASSESSMENT — FIBROSIS 4 INDEX: FIB4 SCORE: 1.04

## 2020-06-22 NOTE — DISCHARGE PLANNING
DISCHARGE PLANNING NOTE - TOTAL JOINT     Procedure: Procedure(s):  ARTHROPLASTY, KNEE, TOTAL  Procedure Date: 6/26/2020  Insurance:  Payor: [facility] / Plan: [facility] / Product Type: Multiple /   Equipment currently available at home? cane, crutches, front-wheel walker and shower chair  Steps into the home? 3  Steps within the home? 0  Toilet height? Standard  Type of shower? tub-shower  Who will be with you during your recovery? SO  Is Outpatient Physical Therapy set up after surgery? Yes  Did you take the Total Joint Class and where? Yes, at [facility]. NAON book for knees given to pt.   Planning same day discharge? Yes.     This writer met with pt during his preadmission appt. Pt states he has all needed equipment. Home safety checklist reviewed and copy given to pt. All questions answered and pt verbalizes understanding. Anticipate dc to home without barriers.

## 2020-06-22 NOTE — OR NURSING
"Preadmit appointment: \" Preparing for your Procedure information\" sheet given to patient with verbal and written instructions. Patient instructed to continue prescribed medications through the day before surgery, instructed to take the following medications the day of surgery per anesthesia protocol: Hydrocodone if needed.  Pt denies any burning, urgency, pain, frequency, with urination.  Pt to have COVID testing after preadmit appt and is aware to self isolate and to notify MD if any illness symptoms develop.  Call placed to Dr. Johnston's office and left message for BROOKLYN Tatum regarding patient states he held his Plavix starting today,  Neurology clearance states if possible have the patent stay on Plavix 75 mg po during the surgery.  Advised pt too to contact Dr. Johnston today for further instructions on whether to hold Plavix or stay on.  Dr. Johnson notified of admit, healthy summary on Plavix recommendation.   "

## 2020-06-23 ENCOUNTER — TELEPHONE (OUTPATIENT)
Dept: MEDICAL GROUP | Facility: PHYSICIAN GROUP | Age: 60
End: 2020-06-23

## 2020-06-23 LAB
SCCMEC + MECA PNL NOSE NAA+PROBE: NEGATIVE
SCCMEC + MECA PNL NOSE NAA+PROBE: NEGATIVE

## 2020-06-23 NOTE — OR NURSING
Patient needs a PCP clearance regarding other comorbidities including a high BMI and h/o HTN.  The use of plavix and subsequent discontinuation perioperatively should be a high priority discussion between Dr Johnston and the neurologist for proper care of this patient with a recent stroke.      Thank you.       Juanito Johnson M.D.  [facility]    The above Dr. Johnson note faxed and called to Dr. Johnston's office, spoke to Manuela with the above request from Dr. Johnson.

## 2020-06-23 NOTE — TELEPHONE ENCOUNTER
Received a Fax from [facility] stating that pt will be having total knee surgery arthroplasty. Please advise pt when to discontinue Plavix medication. Surgery is this Friday. Please fax the pre-op clearance to: [facility] [phone]

## 2020-06-24 ENCOUNTER — TELEPHONE (OUTPATIENT)
Dept: MEDICAL GROUP | Facility: PHYSICIAN GROUP | Age: 60
End: 2020-06-24

## 2020-06-24 NOTE — OR NURSING
Call placed to Dr. Johnston's nurse, received Deepti's MA voicemail, left her a message reiterated Dr. Johnson's request for PCP clearance and the use of plavix and subsequent discontinuation perioperatively should be a high priority discussion between Dr. Johnston and the neurologist for proper care of this patient with a recent stroke.  I also informed her of the following note in epic to an MA from [facility] that is not what Dr. Johnson was requesting:    Received a Fax from [facility] stating that pt will be having total knee surgery arthroplasty. Please advise pt when to discontinue Plavix medication. Surgery is this Friday. Please fax the pre-op clearance to: [facility] [phone]

## 2020-06-24 NOTE — TELEPHONE ENCOUNTER
Called and spoke to Edwina ([facility] - Dr. Johnston) and informed her that I have faxed over Neuro notes for Dr Johnston to review, and to contact PCP for further discussion on Rx Plavix.     Neuro notes have been faxed to [phone]  Dr. Johnston's Office Ph. [phone]

## 2020-06-24 NOTE — OR NURSING
Edwina from Dr. Johnston's office called to follow up on Dr. Johnson's request,  refaxed Dr. Johnson's note to her along with the neurology clearance

## 2020-06-25 ENCOUNTER — TELEPHONE (OUTPATIENT)
Dept: NEUROLOGY | Facility: MEDICAL CENTER | Age: 60
End: 2020-06-25

## 2020-06-25 NOTE — OR NURSING
Spoke with Dr. Johnson, he stated that Dr. Johnston and pt's neurologist spoke and both agreed pt is to stay on the plavix.  Pt aware to stay on plavix.  Communication from Dr. Johnson received any further needs will be determined by anesthesiologist DC

## 2020-06-26 ENCOUNTER — APPOINTMENT (OUTPATIENT)
Dept: RADIOLOGY | Facility: MEDICAL CENTER | Age: 60
End: 2020-06-26
Attending: PHYSICIAN ASSISTANT
Payer: COMMERCIAL

## 2020-06-26 ENCOUNTER — ANESTHESIA (OUTPATIENT)
Dept: SURGERY | Facility: MEDICAL CENTER | Age: 60
End: 2020-06-26
Payer: COMMERCIAL

## 2020-06-26 ENCOUNTER — ANESTHESIA EVENT (OUTPATIENT)
Dept: SURGERY | Facility: MEDICAL CENTER | Age: 60
End: 2020-06-26
Payer: COMMERCIAL

## 2020-06-26 ENCOUNTER — HOSPITAL ENCOUNTER (OUTPATIENT)
Facility: MEDICAL CENTER | Age: 60
End: 2020-06-27
Attending: ORTHOPAEDIC SURGERY | Admitting: ORTHOPAEDIC SURGERY
Payer: COMMERCIAL

## 2020-06-26 DIAGNOSIS — Z01.812 PRE-OPERATIVE LABORATORY EXAMINATION: ICD-10-CM

## 2020-06-26 PROBLEM — Z96.651 STATUS POST TOTAL KNEE REPLACEMENT, RIGHT: Status: ACTIVE | Noted: 2020-06-26

## 2020-06-26 LAB
ABO GROUP BLD: NORMAL
BLD GP AB SCN SERPL QL: NORMAL
RH BLD: NORMAL

## 2020-06-26 PROCEDURE — 96365 THER/PROPH/DIAG IV INF INIT: CPT

## 2020-06-26 PROCEDURE — 700111 HCHG RX REV CODE 636 W/ 250 OVERRIDE (IP): Performed by: PHYSICIAN ASSISTANT

## 2020-06-26 PROCEDURE — 700102 HCHG RX REV CODE 250 W/ 637 OVERRIDE(OP): Performed by: ANESTHESIOLOGY

## 2020-06-26 PROCEDURE — 160002 HCHG RECOVERY MINUTES (STAT): Performed by: ORTHOPAEDIC SURGERY

## 2020-06-26 PROCEDURE — G0378 HOSPITAL OBSERVATION PER HR: HCPCS

## 2020-06-26 PROCEDURE — 86901 BLOOD TYPING SEROLOGIC RH(D): CPT

## 2020-06-26 PROCEDURE — L8699 PROSTHETIC IMPLANT NOS: HCPCS | Performed by: ORTHOPAEDIC SURGERY

## 2020-06-26 PROCEDURE — 160029 HCHG SURGERY MINUTES - 1ST 30 MINS LEVEL 4: Performed by: ORTHOPAEDIC SURGERY

## 2020-06-26 PROCEDURE — 160041 HCHG SURGERY MINUTES - EA ADDL 1 MIN LEVEL 4: Performed by: ORTHOPAEDIC SURGERY

## 2020-06-26 PROCEDURE — 700101 HCHG RX REV CODE 250: Performed by: PHYSICIAN ASSISTANT

## 2020-06-26 PROCEDURE — A9270 NON-COVERED ITEM OR SERVICE: HCPCS | Performed by: PHYSICIAN ASSISTANT

## 2020-06-26 PROCEDURE — A9270 NON-COVERED ITEM OR SERVICE: HCPCS | Performed by: ANESTHESIOLOGY

## 2020-06-26 PROCEDURE — 94760 N-INVAS EAR/PLS OXIMETRY 1: CPT

## 2020-06-26 PROCEDURE — 700105 HCHG RX REV CODE 258: Performed by: ANESTHESIOLOGY

## 2020-06-26 PROCEDURE — 96375 TX/PRO/DX INJ NEW DRUG ADDON: CPT

## 2020-06-26 PROCEDURE — 700101 HCHG RX REV CODE 250: Performed by: ORTHOPAEDIC SURGERY

## 2020-06-26 PROCEDURE — 502000 HCHG MISC OR IMPLANTS RC 0278: Performed by: ORTHOPAEDIC SURGERY

## 2020-06-26 PROCEDURE — 700111 HCHG RX REV CODE 636 W/ 250 OVERRIDE (IP): Performed by: ANESTHESIOLOGY

## 2020-06-26 PROCEDURE — 160009 HCHG ANES TIME/MIN: Performed by: ORTHOPAEDIC SURGERY

## 2020-06-26 PROCEDURE — 700102 HCHG RX REV CODE 250 W/ 637 OVERRIDE(OP): Performed by: PHYSICIAN ASSISTANT

## 2020-06-26 PROCEDURE — 700101 HCHG RX REV CODE 250: Performed by: ANESTHESIOLOGY

## 2020-06-26 PROCEDURE — 94660 CPAP INITIATION&MGMT: CPT

## 2020-06-26 PROCEDURE — 160036 HCHG PACU - EA ADDL 30 MINS PHASE I: Performed by: ORTHOPAEDIC SURGERY

## 2020-06-26 PROCEDURE — 700102 HCHG RX REV CODE 250 W/ 637 OVERRIDE(OP)

## 2020-06-26 PROCEDURE — 700105 HCHG RX REV CODE 258: Performed by: PHYSICIAN ASSISTANT

## 2020-06-26 PROCEDURE — A9270 NON-COVERED ITEM OR SERVICE: HCPCS

## 2020-06-26 PROCEDURE — 160048 HCHG OR STATISTICAL LEVEL 1-5: Performed by: ORTHOPAEDIC SURGERY

## 2020-06-26 PROCEDURE — 97165 OT EVAL LOW COMPLEX 30 MIN: CPT

## 2020-06-26 PROCEDURE — 700111 HCHG RX REV CODE 636 W/ 250 OVERRIDE (IP): Performed by: ORTHOPAEDIC SURGERY

## 2020-06-26 PROCEDURE — 502579 HCHG PACK, TOTAL KNEE: Performed by: ORTHOPAEDIC SURGERY

## 2020-06-26 PROCEDURE — 86900 BLOOD TYPING SEROLOGIC ABO: CPT

## 2020-06-26 PROCEDURE — 86850 RBC ANTIBODY SCREEN: CPT

## 2020-06-26 PROCEDURE — 501838 HCHG SUTURE GENERAL: Performed by: ORTHOPAEDIC SURGERY

## 2020-06-26 PROCEDURE — 160035 HCHG PACU - 1ST 60 MINS PHASE I: Performed by: ORTHOPAEDIC SURGERY

## 2020-06-26 PROCEDURE — 96376 TX/PRO/DX INJ SAME DRUG ADON: CPT

## 2020-06-26 PROCEDURE — 73560 X-RAY EXAM OF KNEE 1 OR 2: CPT | Mod: RT

## 2020-06-26 DEVICE — IMPLANT LEGION PS OXIN FEM SZ6 RT: Type: IMPLANTABLE DEVICE | Site: KNEE | Status: FUNCTIONAL

## 2020-06-26 DEVICE — BONE CEMENT SIMPLEX FULL DOSE - (10EA/PK): Type: IMPLANTABLE DEVICE | Site: KNEE | Status: FUNCTIONAL

## 2020-06-26 DEVICE — PATELLA GII OVAL RESURFACING PAT 35MM (1EA): Type: IMPLANTABLE DEVICE | Site: KNEE | Status: FUNCTIONAL

## 2020-06-26 DEVICE — IMPLANT GNS II CMT TIB SIZE 5 RIGHT (1EA): Type: IMPLANTABLE DEVICE | Site: KNEE | Status: FUNCTIONAL

## 2020-06-26 DEVICE — IMPLANTABLE DEVICE: Type: IMPLANTABLE DEVICE | Site: KNEE | Status: FUNCTIONAL

## 2020-06-26 RX ORDER — DIPHENHYDRAMINE HCL 25 MG
25 TABLET ORAL EVERY 6 HOURS PRN
Status: DISCONTINUED | OUTPATIENT
Start: 2020-06-26 | End: 2020-06-27 | Stop reason: HOSPADM

## 2020-06-26 RX ORDER — AMITRIPTYLINE HYDROCHLORIDE 50 MG/1
50 TABLET, FILM COATED ORAL
Status: DISCONTINUED | OUTPATIENT
Start: 2020-06-26 | End: 2020-06-27 | Stop reason: HOSPADM

## 2020-06-26 RX ORDER — HALOPERIDOL 5 MG/ML
1 INJECTION INTRAMUSCULAR
Status: DISCONTINUED | OUTPATIENT
Start: 2020-06-26 | End: 2020-06-26 | Stop reason: HOSPADM

## 2020-06-26 RX ORDER — LIDOCAINE HYDROCHLORIDE 20 MG/ML
INJECTION, SOLUTION EPIDURAL; INFILTRATION; INTRACAUDAL; PERINEURAL PRN
Status: DISCONTINUED | OUTPATIENT
Start: 2020-06-26 | End: 2020-06-26 | Stop reason: SURG

## 2020-06-26 RX ORDER — MEPERIDINE HYDROCHLORIDE 25 MG/ML
12.5 INJECTION INTRAMUSCULAR; INTRAVENOUS; SUBCUTANEOUS
Status: DISCONTINUED | OUTPATIENT
Start: 2020-06-26 | End: 2020-06-26 | Stop reason: HOSPADM

## 2020-06-26 RX ORDER — LABETALOL HYDROCHLORIDE 5 MG/ML
INJECTION, SOLUTION INTRAVENOUS PRN
Status: DISCONTINUED | OUTPATIENT
Start: 2020-06-26 | End: 2020-06-26 | Stop reason: SURG

## 2020-06-26 RX ORDER — PROMETHAZINE HYDROCHLORIDE 25 MG/1
25 SUPPOSITORY RECTAL EVERY 6 HOURS PRN
Status: DISCONTINUED | OUTPATIENT
Start: 2020-06-26 | End: 2020-06-27 | Stop reason: HOSPADM

## 2020-06-26 RX ORDER — CHLORPROMAZINE HYDROCHLORIDE 25 MG/ML
25 INJECTION INTRAMUSCULAR EVERY 6 HOURS PRN
Status: DISCONTINUED | OUTPATIENT
Start: 2020-06-26 | End: 2020-06-27 | Stop reason: HOSPADM

## 2020-06-26 RX ORDER — CEFAZOLIN SODIUM 1 G/3ML
INJECTION, POWDER, FOR SOLUTION INTRAMUSCULAR; INTRAVENOUS PRN
Status: DISCONTINUED | OUTPATIENT
Start: 2020-06-26 | End: 2020-06-26 | Stop reason: SURG

## 2020-06-26 RX ORDER — HALOPERIDOL 5 MG/ML
1 INJECTION INTRAMUSCULAR EVERY 6 HOURS PRN
Status: DISCONTINUED | OUTPATIENT
Start: 2020-06-26 | End: 2020-06-27 | Stop reason: HOSPADM

## 2020-06-26 RX ORDER — MAGNESIUM SULFATE HEPTAHYDRATE 40 MG/ML
INJECTION, SOLUTION INTRAVENOUS PRN
Status: DISCONTINUED | OUTPATIENT
Start: 2020-06-26 | End: 2020-06-26 | Stop reason: SURG

## 2020-06-26 RX ORDER — SODIUM CHLORIDE 9 MG/ML
INJECTION, SOLUTION INTRAVENOUS CONTINUOUS
Status: ACTIVE | OUTPATIENT
Start: 2020-06-26 | End: 2020-06-26

## 2020-06-26 RX ORDER — SODIUM CHLORIDE 9 MG/ML
INJECTION, SOLUTION INTRAMUSCULAR; INTRAVENOUS; SUBCUTANEOUS
Status: DISCONTINUED | OUTPATIENT
Start: 2020-06-26 | End: 2020-06-26 | Stop reason: HOSPADM

## 2020-06-26 RX ORDER — ONDANSETRON 2 MG/ML
4 INJECTION INTRAMUSCULAR; INTRAVENOUS
Status: DISCONTINUED | OUTPATIENT
Start: 2020-06-26 | End: 2020-06-26 | Stop reason: HOSPADM

## 2020-06-26 RX ORDER — DEXAMETHASONE SODIUM PHOSPHATE 4 MG/ML
6 INJECTION, SOLUTION INTRA-ARTICULAR; INTRALESIONAL; INTRAMUSCULAR; INTRAVENOUS; SOFT TISSUE ONCE
Status: COMPLETED | OUTPATIENT
Start: 2020-06-27 | End: 2020-06-27

## 2020-06-26 RX ORDER — DIPHENHYDRAMINE HYDROCHLORIDE 50 MG/ML
12.5 INJECTION INTRAMUSCULAR; INTRAVENOUS
Status: DISCONTINUED | OUTPATIENT
Start: 2020-06-26 | End: 2020-06-26 | Stop reason: HOSPADM

## 2020-06-26 RX ORDER — DOCUSATE SODIUM 100 MG/1
100 CAPSULE, LIQUID FILLED ORAL 2 TIMES DAILY
Status: DISCONTINUED | OUTPATIENT
Start: 2020-06-26 | End: 2020-06-27 | Stop reason: HOSPADM

## 2020-06-26 RX ORDER — HYDROMORPHONE HYDROCHLORIDE 1 MG/ML
0.2 INJECTION, SOLUTION INTRAMUSCULAR; INTRAVENOUS; SUBCUTANEOUS
Status: DISCONTINUED | OUTPATIENT
Start: 2020-06-26 | End: 2020-06-26 | Stop reason: HOSPADM

## 2020-06-26 RX ORDER — DIAZEPAM 5 MG/1
5 TABLET ORAL EVERY 6 HOURS PRN
Status: DISCONTINUED | OUTPATIENT
Start: 2020-06-26 | End: 2020-06-27 | Stop reason: HOSPADM

## 2020-06-26 RX ORDER — LISINOPRIL 5 MG/1
10 TABLET ORAL DAILY
Status: DISCONTINUED | OUTPATIENT
Start: 2020-06-26 | End: 2020-06-27 | Stop reason: HOSPADM

## 2020-06-26 RX ORDER — ROCURONIUM BROMIDE 10 MG/ML
INJECTION, SOLUTION INTRAVENOUS PRN
Status: DISCONTINUED | OUTPATIENT
Start: 2020-06-26 | End: 2020-06-26 | Stop reason: SURG

## 2020-06-26 RX ORDER — ONDANSETRON 2 MG/ML
4 INJECTION INTRAMUSCULAR; INTRAVENOUS EVERY 4 HOURS PRN
Status: DISCONTINUED | OUTPATIENT
Start: 2020-06-26 | End: 2020-06-27 | Stop reason: HOSPADM

## 2020-06-26 RX ORDER — ACETAMINOPHEN 500 MG
500 TABLET ORAL EVERY 6 HOURS
Status: DISCONTINUED | OUTPATIENT
Start: 2020-06-26 | End: 2020-06-27 | Stop reason: HOSPADM

## 2020-06-26 RX ORDER — KETOROLAC TROMETHAMINE 30 MG/ML
INJECTION, SOLUTION INTRAMUSCULAR; INTRAVENOUS
Status: DISCONTINUED | OUTPATIENT
Start: 2020-06-26 | End: 2020-06-26 | Stop reason: HOSPADM

## 2020-06-26 RX ORDER — DEXAMETHASONE SODIUM PHOSPHATE 4 MG/ML
4 INJECTION, SOLUTION INTRA-ARTICULAR; INTRALESIONAL; INTRAMUSCULAR; INTRAVENOUS; SOFT TISSUE
Status: COMPLETED | OUTPATIENT
Start: 2020-06-26 | End: 2020-06-26

## 2020-06-26 RX ORDER — HYDROMORPHONE HYDROCHLORIDE 2 MG/ML
INJECTION, SOLUTION INTRAMUSCULAR; INTRAVENOUS; SUBCUTANEOUS PRN
Status: DISCONTINUED | OUTPATIENT
Start: 2020-06-26 | End: 2020-06-26 | Stop reason: SURG

## 2020-06-26 RX ORDER — CELECOXIB 200 MG/1
200 CAPSULE ORAL ONCE
Status: COMPLETED | OUTPATIENT
Start: 2020-06-26 | End: 2020-06-26

## 2020-06-26 RX ORDER — ACETAMINOPHEN 500 MG
1000 TABLET ORAL ONCE
Status: COMPLETED | OUTPATIENT
Start: 2020-06-26 | End: 2020-06-26

## 2020-06-26 RX ORDER — ONDANSETRON 4 MG/1
4 TABLET, ORALLY DISINTEGRATING ORAL EVERY 4 HOURS PRN
Status: DISCONTINUED | OUTPATIENT
Start: 2020-06-26 | End: 2020-06-27 | Stop reason: HOSPADM

## 2020-06-26 RX ORDER — HYDROMORPHONE HYDROCHLORIDE 1 MG/ML
0.4 INJECTION, SOLUTION INTRAMUSCULAR; INTRAVENOUS; SUBCUTANEOUS
Status: DISCONTINUED | OUTPATIENT
Start: 2020-06-26 | End: 2020-06-26 | Stop reason: HOSPADM

## 2020-06-26 RX ORDER — HYDRALAZINE HYDROCHLORIDE 20 MG/ML
5 INJECTION INTRAMUSCULAR; INTRAVENOUS
Status: DISCONTINUED | OUTPATIENT
Start: 2020-06-26 | End: 2020-06-26 | Stop reason: HOSPADM

## 2020-06-26 RX ORDER — HYDROMORPHONE HYDROCHLORIDE 1 MG/ML
0.5 INJECTION, SOLUTION INTRAMUSCULAR; INTRAVENOUS; SUBCUTANEOUS
Status: DISCONTINUED | OUTPATIENT
Start: 2020-06-26 | End: 2020-06-27 | Stop reason: HOSPADM

## 2020-06-26 RX ORDER — DEXAMETHASONE SODIUM PHOSPHATE 4 MG/ML
INJECTION, SOLUTION INTRA-ARTICULAR; INTRALESIONAL; INTRAMUSCULAR; INTRAVENOUS; SOFT TISSUE PRN
Status: DISCONTINUED | OUTPATIENT
Start: 2020-06-26 | End: 2020-06-26 | Stop reason: SURG

## 2020-06-26 RX ORDER — DIPHENHYDRAMINE HYDROCHLORIDE 50 MG/ML
25 INJECTION INTRAMUSCULAR; INTRAVENOUS EVERY 6 HOURS PRN
Status: DISCONTINUED | OUTPATIENT
Start: 2020-06-26 | End: 2020-06-27 | Stop reason: HOSPADM

## 2020-06-26 RX ORDER — KETAMINE HYDROCHLORIDE 50 MG/ML
INJECTION, SOLUTION INTRAMUSCULAR; INTRAVENOUS PRN
Status: DISCONTINUED | OUTPATIENT
Start: 2020-06-26 | End: 2020-06-26 | Stop reason: SURG

## 2020-06-26 RX ORDER — CHLORPROMAZINE HYDROCHLORIDE 25 MG/1
25 TABLET, FILM COATED ORAL EVERY 6 HOURS PRN
Status: DISCONTINUED | OUTPATIENT
Start: 2020-06-26 | End: 2020-06-27 | Stop reason: HOSPADM

## 2020-06-26 RX ORDER — SODIUM CHLORIDE, SODIUM LACTATE, POTASSIUM CHLORIDE, CALCIUM CHLORIDE 600; 310; 30; 20 MG/100ML; MG/100ML; MG/100ML; MG/100ML
INJECTION, SOLUTION INTRAVENOUS CONTINUOUS
Status: DISCONTINUED | OUTPATIENT
Start: 2020-06-26 | End: 2020-06-26 | Stop reason: HOSPADM

## 2020-06-26 RX ORDER — HYDROCODONE BITARTRATE AND ACETAMINOPHEN 10; 325 MG/1; MG/1
.5-1 TABLET ORAL EVERY 4 HOURS PRN
Status: DISCONTINUED | OUTPATIENT
Start: 2020-06-26 | End: 2020-06-27 | Stop reason: HOSPADM

## 2020-06-26 RX ORDER — GABAPENTIN 300 MG/1
300 CAPSULE ORAL ONCE
Status: COMPLETED | OUTPATIENT
Start: 2020-06-26 | End: 2020-06-26

## 2020-06-26 RX ORDER — ZOLPIDEM TARTRATE 5 MG/1
5 TABLET ORAL NIGHTLY PRN
Status: DISCONTINUED | OUTPATIENT
Start: 2020-06-27 | End: 2020-06-27 | Stop reason: HOSPADM

## 2020-06-26 RX ORDER — HYDROMORPHONE HYDROCHLORIDE 1 MG/ML
0.5 INJECTION, SOLUTION INTRAMUSCULAR; INTRAVENOUS; SUBCUTANEOUS
Status: DISCONTINUED | OUTPATIENT
Start: 2020-06-26 | End: 2020-06-26 | Stop reason: HOSPADM

## 2020-06-26 RX ORDER — BUPIVACAINE HYDROCHLORIDE AND EPINEPHRINE 2.5; 5 MG/ML; UG/ML
INJECTION, SOLUTION EPIDURAL; INFILTRATION; INTRACAUDAL; PERINEURAL
Status: DISCONTINUED | OUTPATIENT
Start: 2020-06-26 | End: 2020-06-26 | Stop reason: HOSPADM

## 2020-06-26 RX ORDER — SODIUM CHLORIDE, SODIUM LACTATE, POTASSIUM CHLORIDE, CALCIUM CHLORIDE 600; 310; 30; 20 MG/100ML; MG/100ML; MG/100ML; MG/100ML
1000 INJECTION, SOLUTION INTRAVENOUS CONTINUOUS
Status: CANCELLED | OUTPATIENT
Start: 2020-06-26

## 2020-06-26 RX ORDER — PROCHLORPERAZINE MALEATE 10 MG
10 TABLET ORAL EVERY 6 HOURS PRN
Status: DISCONTINUED | OUTPATIENT
Start: 2020-06-26 | End: 2020-06-27 | Stop reason: HOSPADM

## 2020-06-26 RX ORDER — OXYCODONE HCL 5 MG/5 ML
5 SOLUTION, ORAL ORAL
Status: COMPLETED | OUTPATIENT
Start: 2020-06-26 | End: 2020-06-26

## 2020-06-26 RX ORDER — ATORVASTATIN CALCIUM 40 MG/1
80 TABLET, FILM COATED ORAL EVERY EVENING
Status: DISCONTINUED | OUTPATIENT
Start: 2020-06-26 | End: 2020-06-27 | Stop reason: HOSPADM

## 2020-06-26 RX ORDER — SCOLOPAMINE TRANSDERMAL SYSTEM 1 MG/1
1 PATCH, EXTENDED RELEASE TRANSDERMAL
Status: DISCONTINUED | OUTPATIENT
Start: 2020-06-26 | End: 2020-06-27 | Stop reason: HOSPADM

## 2020-06-26 RX ORDER — AMOXICILLIN 250 MG
1 CAPSULE ORAL NIGHTLY
Status: DISCONTINUED | OUTPATIENT
Start: 2020-06-26 | End: 2020-06-27 | Stop reason: HOSPADM

## 2020-06-26 RX ORDER — ENEMA 19; 7 G/133ML; G/133ML
1 ENEMA RECTAL
Status: DISCONTINUED | OUTPATIENT
Start: 2020-06-26 | End: 2020-06-27 | Stop reason: HOSPADM

## 2020-06-26 RX ORDER — ONDANSETRON 2 MG/ML
INJECTION INTRAMUSCULAR; INTRAVENOUS PRN
Status: DISCONTINUED | OUTPATIENT
Start: 2020-06-26 | End: 2020-06-26 | Stop reason: SURG

## 2020-06-26 RX ORDER — SODIUM CHLORIDE, SODIUM LACTATE, POTASSIUM CHLORIDE, CALCIUM CHLORIDE 600; 310; 30; 20 MG/100ML; MG/100ML; MG/100ML; MG/100ML
INJECTION, SOLUTION INTRAVENOUS
Status: DISCONTINUED | OUTPATIENT
Start: 2020-06-26 | End: 2020-06-26 | Stop reason: SURG

## 2020-06-26 RX ORDER — MIDAZOLAM HYDROCHLORIDE 1 MG/ML
INJECTION INTRAMUSCULAR; INTRAVENOUS PRN
Status: DISCONTINUED | OUTPATIENT
Start: 2020-06-26 | End: 2020-06-26 | Stop reason: SURG

## 2020-06-26 RX ORDER — TRAMADOL HYDROCHLORIDE 50 MG/1
50 TABLET ORAL EVERY 4 HOURS PRN
Status: DISCONTINUED | OUTPATIENT
Start: 2020-06-26 | End: 2020-06-27 | Stop reason: HOSPADM

## 2020-06-26 RX ORDER — BISACODYL 10 MG
10 SUPPOSITORY, RECTAL RECTAL
Status: DISCONTINUED | OUTPATIENT
Start: 2020-06-26 | End: 2020-06-27 | Stop reason: HOSPADM

## 2020-06-26 RX ORDER — POLYETHYLENE GLYCOL 3350 17 G/17G
1 POWDER, FOR SOLUTION ORAL 2 TIMES DAILY PRN
Status: DISCONTINUED | OUTPATIENT
Start: 2020-06-26 | End: 2020-06-27 | Stop reason: HOSPADM

## 2020-06-26 RX ORDER — DEXMEDETOMIDINE HYDROCHLORIDE 100 UG/ML
INJECTION, SOLUTION INTRAVENOUS PRN
Status: DISCONTINUED | OUTPATIENT
Start: 2020-06-26 | End: 2020-06-26 | Stop reason: SURG

## 2020-06-26 RX ORDER — OXYCODONE HCL 5 MG/5 ML
10 SOLUTION, ORAL ORAL
Status: COMPLETED | OUTPATIENT
Start: 2020-06-26 | End: 2020-06-26

## 2020-06-26 RX ORDER — CLOPIDOGREL BISULFATE 75 MG/1
75 TABLET ORAL DAILY
Status: DISCONTINUED | OUTPATIENT
Start: 2020-06-26 | End: 2020-06-27 | Stop reason: HOSPADM

## 2020-06-26 RX ORDER — AMOXICILLIN 250 MG
1 CAPSULE ORAL
Status: DISCONTINUED | OUTPATIENT
Start: 2020-06-26 | End: 2020-06-27 | Stop reason: HOSPADM

## 2020-06-26 RX ADMIN — HYDROCODONE BITARTRATE AND ACETAMINOPHEN 1 TABLET: 10; 325 TABLET ORAL at 22:05

## 2020-06-26 RX ADMIN — SUGAMMADEX 200 MG: 100 INJECTION, SOLUTION INTRAVENOUS at 09:59

## 2020-06-26 RX ADMIN — DEXAMETHASONE SODIUM PHOSPHATE 4 MG: 4 INJECTION, SOLUTION INTRA-ARTICULAR; INTRALESIONAL; INTRAMUSCULAR; INTRAVENOUS; SOFT TISSUE at 16:29

## 2020-06-26 RX ADMIN — SODIUM CHLORIDE: 9 INJECTION, SOLUTION INTRAVENOUS at 12:25

## 2020-06-26 RX ADMIN — DEXMEDETOMIDINE HYDROCHLORIDE 50 MCG: 100 INJECTION, SOLUTION INTRAVENOUS at 09:25

## 2020-06-26 RX ADMIN — PROPOFOL 50 MG: 10 INJECTION, EMULSION INTRAVENOUS at 09:14

## 2020-06-26 RX ADMIN — SODIUM CHLORIDE, POTASSIUM CHLORIDE, SODIUM LACTATE AND CALCIUM CHLORIDE: 600; 310; 30; 20 INJECTION, SOLUTION INTRAVENOUS at 09:52

## 2020-06-26 RX ADMIN — PROPOFOL 50 MG: 10 INJECTION, EMULSION INTRAVENOUS at 09:17

## 2020-06-26 RX ADMIN — MAGNESIUM SULFATE IN WATER 4 G: 40 INJECTION, SOLUTION INTRAVENOUS at 09:10

## 2020-06-26 RX ADMIN — DEXAMETHASONE SODIUM PHOSPHATE 8 MG: 4 INJECTION, SOLUTION INTRAMUSCULAR; INTRAVENOUS at 08:56

## 2020-06-26 RX ADMIN — SODIUM CHLORIDE, POTASSIUM CHLORIDE, SODIUM LACTATE AND CALCIUM CHLORIDE: 600; 310; 30; 20 INJECTION, SOLUTION INTRAVENOUS at 08:47

## 2020-06-26 RX ADMIN — ACETAMINOPHEN 500 MG: 500 TABLET, FILM COATED ORAL at 13:02

## 2020-06-26 RX ADMIN — LISINOPRIL 10 MG: 5 TABLET ORAL at 13:05

## 2020-06-26 RX ADMIN — MIDAZOLAM HYDROCHLORIDE 1 MG: 1 INJECTION, SOLUTION INTRAMUSCULAR; INTRAVENOUS at 08:48

## 2020-06-26 RX ADMIN — KETAMINE HYDROCHLORIDE 75 MG: 50 INJECTION INTRAMUSCULAR; INTRAVENOUS at 08:51

## 2020-06-26 RX ADMIN — ONDANSETRON 4 MG: 2 INJECTION INTRAMUSCULAR; INTRAVENOUS at 14:29

## 2020-06-26 RX ADMIN — CELECOXIB 200 MG: 200 CAPSULE ORAL at 07:52

## 2020-06-26 RX ADMIN — FENTANYL CITRATE 50 MCG: 50 INJECTION INTRAMUSCULAR; INTRAVENOUS at 11:26

## 2020-06-26 RX ADMIN — FENTANYL CITRATE 50 MCG: 50 INJECTION INTRAMUSCULAR; INTRAVENOUS at 10:51

## 2020-06-26 RX ADMIN — HYDROCODONE BITARTRATE AND ACETAMINOPHEN 1 TABLET: 10; 325 TABLET ORAL at 13:02

## 2020-06-26 RX ADMIN — PHENYLEPHRINE HYDROCHLORIDE 20 MCG/MIN: 10 INJECTION INTRAVENOUS at 09:03

## 2020-06-26 RX ADMIN — PROPOFOL 50 MG: 10 INJECTION, EMULSION INTRAVENOUS at 09:12

## 2020-06-26 RX ADMIN — PROPOFOL 150 MG: 10 INJECTION, EMULSION INTRAVENOUS at 08:51

## 2020-06-26 RX ADMIN — HYDROMORPHONE HYDROCHLORIDE 0.5 MG: 2 INJECTION, SOLUTION INTRAMUSCULAR; INTRAVENOUS; SUBCUTANEOUS at 09:09

## 2020-06-26 RX ADMIN — ONDANSETRON 4 MG: 2 INJECTION INTRAMUSCULAR; INTRAVENOUS at 09:59

## 2020-06-26 RX ADMIN — HYDROMORPHONE HYDROCHLORIDE 1 MG: 2 INJECTION, SOLUTION INTRAMUSCULAR; INTRAVENOUS; SUBCUTANEOUS at 08:51

## 2020-06-26 RX ADMIN — POVIDONE-IODINE 15 ML: 10 SOLUTION TOPICAL at 07:53

## 2020-06-26 RX ADMIN — ACETAMINOPHEN 1000 MG: 500 TABLET, FILM COATED ORAL at 07:52

## 2020-06-26 RX ADMIN — KETAMINE HYDROCHLORIDE 25 MG: 50 INJECTION INTRAMUSCULAR; INTRAVENOUS at 09:09

## 2020-06-26 RX ADMIN — ROCURONIUM BROMIDE 70 MG: 10 INJECTION, SOLUTION INTRAVENOUS at 08:51

## 2020-06-26 RX ADMIN — CEFAZOLIN 2 G: 10 INJECTION, POWDER, FOR SOLUTION INTRAVENOUS; PARENTERAL at 19:28

## 2020-06-26 RX ADMIN — FENTANYL CITRATE 50 MCG: 50 INJECTION INTRAMUSCULAR; INTRAVENOUS at 10:59

## 2020-06-26 RX ADMIN — LABETALOL HYDROCHLORIDE 10 MG: 5 INJECTION, SOLUTION INTRAVENOUS at 09:27

## 2020-06-26 RX ADMIN — OXYCODONE HYDROCHLORIDE 10 MG: 5 SOLUTION ORAL at 10:51

## 2020-06-26 RX ADMIN — CEFAZOLIN 3 G: 1 INJECTION, POWDER, FOR SOLUTION INTRAVENOUS at 08:47

## 2020-06-26 RX ADMIN — LIDOCAINE HYDROCHLORIDE 100 MG: 20 INJECTION, SOLUTION EPIDURAL; INFILTRATION; INTRACAUDAL; PERINEURAL at 08:51

## 2020-06-26 RX ADMIN — TRANEXAMIC ACID 2000 MG: 100 INJECTION, SOLUTION INTRAVENOUS at 11:02

## 2020-06-26 RX ADMIN — GABAPENTIN 300 MG: 300 CAPSULE ORAL at 07:52

## 2020-06-26 ASSESSMENT — COGNITIVE AND FUNCTIONAL STATUS - GENERAL
SUGGESTED CMS G CODE MODIFIER DAILY ACTIVITY: CJ
DRESSING REGULAR LOWER BODY CLOTHING: A LITTLE
HELP NEEDED FOR BATHING: A LITTLE
DAILY ACTIVITIY SCORE: 22

## 2020-06-26 ASSESSMENT — LIFESTYLE VARIABLES
TOTAL SCORE: 2
ALCOHOL_USE: YES
AVERAGE NUMBER OF DAYS PER WEEK YOU HAVE A DRINK CONTAINING ALCOHOL: 1
TOTAL SCORE: 2
EVER HAD A DRINK FIRST THING IN THE MORNING TO STEADY YOUR NERVES TO GET RID OF A HANGOVER: NO
EVER FELT BAD OR GUILTY ABOUT YOUR DRINKING: NO
HOW MANY TIMES IN THE PAST YEAR HAVE YOU HAD 5 OR MORE DRINKS IN A DAY: 2
EVER_SMOKED: NEVER
TOTAL SCORE: 2
ON A TYPICAL DAY WHEN YOU DRINK ALCOHOL HOW MANY DRINKS DO YOU HAVE: 2
HAVE YOU EVER FELT YOU SHOULD CUT DOWN ON YOUR DRINKING: YES
CONSUMPTION TOTAL: POSITIVE
HAVE PEOPLE ANNOYED YOU BY CRITICIZING YOUR DRINKING: YES

## 2020-06-26 ASSESSMENT — PATIENT HEALTH QUESTIONNAIRE - PHQ9
1. LITTLE INTEREST OR PLEASURE IN DOING THINGS: NOT AT ALL
1. LITTLE INTEREST OR PLEASURE IN DOING THINGS: NOT AT ALL
2. FEELING DOWN, DEPRESSED, IRRITABLE, OR HOPELESS: NOT AT ALL
SUM OF ALL RESPONSES TO PHQ9 QUESTIONS 1 AND 2: 0
SUM OF ALL RESPONSES TO PHQ9 QUESTIONS 1 AND 2: 0
2. FEELING DOWN, DEPRESSED, IRRITABLE, OR HOPELESS: NOT AT ALL

## 2020-06-26 ASSESSMENT — PAIN SCALES - GENERAL: PAIN_LEVEL: 4

## 2020-06-26 ASSESSMENT — ACTIVITIES OF DAILY LIVING (ADL): TOILETING: INDEPENDENT

## 2020-06-26 NOTE — ANESTHESIA TIME REPORT
Anesthesia Start and Stop Event Times     Date Time Event    6/26/2020 0827 Ready for Procedure     0847 Anesthesia Start     1030 Anesthesia Stop        Responsible Staff  06/26/20    Name Role Begin End    Gurpreet Diaz M.D. Anesth 0847 1030        Preop Diagnosis (Free Text):  Pre-op Diagnosis     KNEE PAIN, UNILATERAL PRIMARY OSTEOARTHRITIS RIGHT KNEE        Preop Diagnosis (Codes):    Post op Diagnosis  Osteoarthritis of right knee      Premium Reason  Non-Premium    Comments:

## 2020-06-26 NOTE — OR SURGEON
Immediate Post OP Note    PreOp Diagnosis: DJD R knee    PostOp Diagnosis: same    Procedure(s):  ARTHROPLASTY, Right KNEE, TOTAL - Wound Class: Clean    Surgeon(s):  Dev Johnston M.D.    Anesthesiologist/Type of Anesthesia:  Anesthesiologist: Gurpreet Diaz M.D./General    Surgical Staff:  Circulator: Melody Faulkner R.N.  Limb Landry: Rick Alborn  Relief Circulator: Lashon Kim R.N.  Scrub Person: Wang Yi; Willis Lizama  First Assist: ELSI Pineda    Specimens removed if any:  * No specimens in log *    Estimated Blood Loss: 75    Findings: severe tricomp disease    Complications: none        6/26/2020 10:36 AM Dev Johnston M.D.

## 2020-06-26 NOTE — PROGRESS NOTES
Received report from PACU RN  Patient AOx4.  Started on 4L NC, currently at 98%  Reporting some nausea and pain with the R knee.   Bed in lowest position, treaded socks in place, belongings and call light in reach.   Patient educated to use the call light for assistance. Patient educated on POC and goals that are needed to be accomplished prior to discharging.

## 2020-06-26 NOTE — ANESTHESIA PROCEDURE NOTES
Airway    Date/Time: 6/26/2020 8:52 AM  Performed by: Gurpreet Diaz M.D.  Authorized by: Gurpreet Diaz M.D.     Location:  OR  Urgency:  Elective  Difficult Airway: No    Indications for Airway Management:  Anesthesia      Spontaneous Ventilation: absent    Sedation Level:  Deep  Preoxygenated: Yes    Patient Position:  Sniffing  Mask Difficulty Assessment:  2 - vent by mask + OA or adjuvant +/- NMBA  Final Airway Type:  Endotracheal airway  Final Endotracheal Airway:  ETT  Cuffed: Yes    Technique Used for Successful ETT Placement:  Direct laryngoscopy    Insertion Site:  Oral  Blade Type:  Das  Laryngoscope Blade/Videolaryngoscope Blade Size:  2  ETT Size (mm):  7.0  Measured from:  Lips  ETT to Lips (cm):  22  Placement Verified by: auscultation and capnometry    Cormack-Lehane Classification:  Grade IIa - partial view of glottis  Number of Attempts at Approach:  1

## 2020-06-26 NOTE — PROGRESS NOTES
Patient groggy. But alert, able to raise right leg.  On O2.    Wants to go home later, but needs to be safely ambulating and oxygenating.

## 2020-06-26 NOTE — ANESTHESIA POSTPROCEDURE EVALUATION
Patient: Shawn Duffy    Procedure Summary     Date:  06/26/20 Room / Location:   OR 03 / SURGERY [facility]    Anesthesia Start:  0847 Anesthesia Stop:  1030    Procedure:  ARTHROPLASTY, KNEE, TOTAL (Right Knee) Diagnosis:  (KNEE PAIN, UNILATERAL PRIMARY OSTEOARTHRITIS RIGHT KNEE)    Surgeon:  Dev Johnston M.D. Responsible Provider:  Gurpreet Diaz M.D.    Anesthesia Type:  general ASA Status:  3          Final Anesthesia Type: general  Last vitals  BP   Blood Pressure: 146/84    Temp   36 °C (96.8 °F)    Pulse   Pulse: 78   Resp   18    SpO2   97 %      Anesthesia Post Evaluation    Patient location during evaluation: PACU  Patient participation: complete - patient participated  Level of consciousness: awake and alert  Pain score: 4    Airway patency: patent  Anesthetic complications: no  Cardiovascular status: hemodynamically stable  Respiratory status: acceptable  Hydration status: euvolemic    PONV: none           Nurse Pain Score: 4 (NPRS)

## 2020-06-26 NOTE — DISCHARGE PLANNING
SW completed chart review, patient reports he anticipates no needs at d/c. SW is available for any d/c needs.

## 2020-06-26 NOTE — ANESTHESIA PREPROCEDURE EVALUATION
Morbid obesity, BMI 44, HTN, hx of stroke in 11/2019 with evidence of basilar artery stenosis (high grade). TTE from 2019 reviewed (LVEF 65%). Per neurology, keep on Plavix and maintain higher than normal BPs under anesthesia. HERMILA with CPAP dependence. Denies: MI/CHF/smoking/DM/CKD      Relevant Problems   ANESTHESIA   (+) Sleep apnea      CARDIAC   (+) Basilar artery occlusion   (+) Hypertension       Physical Exam    Airway   Mallampati: II  TM distance: >3 FB  Neck ROM: full       Cardiovascular - normal exam  Rhythm: regular  Rate: normal  (-) murmur     Dental - normal exam           Pulmonary - normal exam  Breath sounds clear to auscultation     Abdominal    Neurological - normal exam                 Anesthesia Plan    ASA 3   ASA physical status 3 criteria: morbid obesity - BMI greater than or equal to 40 and CVA or TIA - history (> 3 months)    Plan - general       Airway plan will be ETT  (No peripheral nerve block due to plavix)      Induction: intravenous    Postoperative Plan: Postoperative administration of opioids is intended.    Pertinent diagnostic labs and testing reviewed    Informed Consent:    Anesthetic plan and risks discussed with patient.    Use of blood products discussed with: patient whom consented to blood products.

## 2020-06-26 NOTE — OP REPORT
DATE OF SERVICE:  06/26/2020    LOCATION:  [facility].    PREOPERATIVE DIAGNOSIS:  Degenerative arthritis, right knee.    POSTOPERATIVE DIAGNOSIS:  Degenerative arthritis, right knee.    PROCEDURE:  Right total knee arthroplasty.    SURGEON:  Dev Johnston MD    ANESTHESIA:  General.    ANESTHESIOLOGIST:  Gurpreet Diaz MD    ASSISTANT:  Kailash Jimenez PA-C    ESTIMATED BLOOD LOSS:  75 mL.    COMPONENTS PLACED:  Cemented [employer] size 6 Oxinium posterior   stabilized femur, size 5 tibia with a 15 mm posterior stabilized insert and a   35 mm oval patella.    FINDINGS:  Severe tricompartmental disease.    COMPLICATIONS:  None.    SUMMARY:  The patient was brought to the operating room and anesthesia was   administered.  Antibiotics were given IV.  We did hold tranexamic acid because   of the patient's need for ongoing anticoagulation with Plavix and high risk   for stroke.    Leg was prepped and draped in usual sterile manner.  Leg was exsanguinated,   tourniquet inflated.  Time-out was called.    We made an anterior incision centered over the medial patella.  Standard   medial parapatellar arthrotomy was made, medial release done distally.    Patella was everted.  It measured 23 mm.  We cut it down to uniform 14 mm.  A   35 button had the best coverage.  We prepared that and resected excess bone.    Knee was flexed up.  Tourniquet was released.  We excised the cruciates and   the anterior menisci.  A 5-degree jig was utilized in distal femur.  We just   took the standard amount off because he did not have a huge contracture plus   he had some osteophytes posteriorly.  He measured to a size 6.  He had quite a   wide femur.  We could have maybe gone down to the thigh, but 6 looked like a   better size for overall fit.  We set rotation appropriately, made all the cuts   for size 6 posterior stabilized implant and peripheral osteophytes removed.    Tibia was then exposed.  The  extramedullary jig utilized, lined off the medial   third tubercle, centered the ankle with neutral posterior slope and we took   the standard amount plus an extra 2 off the less involved side.    We took care to protect the medial collateral and the popliteus.  The cruciate   remnants were excised along with the posterior menisci.  Posterior   osteophytes were removed from the femur.  Posterior capsular release was done.    Medial osteophytes removed off the tibia.    Once we had freed up all the soft tissues, he definitely had plenty of space   in his knee.  He was sized to a size 5 tibia.  We pinned it and trialed it   with 13 mm insert.  We had nice full extension and we had symmetrical   stability, no further releases needed to be done.  He had great flexion.    Patella tracked perfectly, so we were comfortable at this point, made our   appropriate punches, the real components were cemented into place.  Cement   debris was removed.  Wound was soaked with dilute Betadine and then flushed   with saline.  We trialed again and he really needed the 15 mm to have adequate   tension and stability and the real 15 was locked securely into position.    The knee was placed in flexion.  The tendon closed with a running #2 Quill,   augmented with #1 Vicryl.  We injected him intra-articular this point with the   whole volume of anesthetic.  The skin was then closed with layers of Vicryl   and running 3-0 Monocryl.  A Prevena dressing was placed followed by   compression wrap.    Of note, certainly with the patient's continued Plavix, there was concern   about bleeding excessive in the case, but that really was not the case.  Dr. Diaz kept the blood pressure high as recommended by the neurologist so   despite the persistent anticoagulation and the need for hypertensive   anesthesia, there really was not any dramatic difference in blood loss from   normal.    The patient was stable during the procedure and went to the  recovery room in   good condition.       ____________________________________     MD LIA STRAUSS / DINAH    DD:  06/26/2020 10:41:52  DT:  06/26/2020 11:19:20    D#:  [id number]  Job#:  [id number]

## 2020-06-26 NOTE — OR NURSING
1028: To PACU from OR via bed, Plan to keep pt in PACU for full hour per [facility] protocol. Patient has a dressing on the right knee which is clean, dry and intact, there is a polar sleeve ove over it. Patient is sleeping and appears to be pain free upon arrival.    1040: Pharmacy contacted for TXA (2g), Imag[facility] contacted for post op Xray.     1045: Patient complaining of 8/10 pain to right knee. Plan to medicate per MAR.    1055: Patient placed on Oxygen at 2 lpm via NC as he was decreasing SpO2 after pain medication. Pain at 6/10, Medicate per MAR is the plan.    1110: CPAP unit employed for the patient's recovery.    1113: TXA Completed and marked in MAR as stopped.Patient states pain is at a 6/10 but is sleeping and easily roused.    1115: Xray at bedside for post op films.    1125: Patient resting, Pain at 6/10, plan to medicate per MAR. Xray completed.    1130: Patient rates pain at 4/10 and states it is tolerable.    1140: Patient is going to room 218 for BRYON Fraser, report called for sarai Harvey said she will call back. Patient is resting with CPAP and still at 4/10. Meets criteria to transfer to the floor.    1155: No change in surgical site assessment. sleeping, respirations spontaneous and non-labored via CPAP, easily rouse able. Second attempt to contact BRYON Fraser via phone without success.     1158: BRYON Oliver took report and is assuming care of the Patient.

## 2020-06-27 VITALS
BODY MASS INDEX: 44.96 KG/M2 | DIASTOLIC BLOOD PRESSURE: 79 MMHG | HEART RATE: 104 BPM | SYSTOLIC BLOOD PRESSURE: 146 MMHG | WEIGHT: 269.84 LBS | TEMPERATURE: 97.2 F | HEIGHT: 65 IN | OXYGEN SATURATION: 94 % | RESPIRATION RATE: 18 BRPM

## 2020-06-27 LAB
HCT VFR BLD AUTO: 35 % (ref 42–52)
HGB BLD-MCNC: 11.8 G/DL (ref 14–18)

## 2020-06-27 PROCEDURE — 36415 COLL VENOUS BLD VENIPUNCTURE: CPT

## 2020-06-27 PROCEDURE — 700102 HCHG RX REV CODE 250 W/ 637 OVERRIDE(OP): Performed by: PHYSICIAN ASSISTANT

## 2020-06-27 PROCEDURE — 97530 THERAPEUTIC ACTIVITIES: CPT

## 2020-06-27 PROCEDURE — 85018 HEMOGLOBIN: CPT

## 2020-06-27 PROCEDURE — 94760 N-INVAS EAR/PLS OXIMETRY 1: CPT

## 2020-06-27 PROCEDURE — G0378 HOSPITAL OBSERVATION PER HR: HCPCS

## 2020-06-27 PROCEDURE — 97162 PT EVAL MOD COMPLEX 30 MIN: CPT

## 2020-06-27 PROCEDURE — 85014 HEMATOCRIT: CPT

## 2020-06-27 PROCEDURE — 97535 SELF CARE MNGMENT TRAINING: CPT

## 2020-06-27 PROCEDURE — A9270 NON-COVERED ITEM OR SERVICE: HCPCS | Performed by: PHYSICIAN ASSISTANT

## 2020-06-27 PROCEDURE — 700111 HCHG RX REV CODE 636 W/ 250 OVERRIDE (IP): Performed by: PHYSICIAN ASSISTANT

## 2020-06-27 PROCEDURE — 700105 HCHG RX REV CODE 258: Performed by: PHYSICIAN ASSISTANT

## 2020-06-27 RX ADMIN — DEXAMETHASONE SODIUM PHOSPHATE 6 MG: 4 INJECTION, SOLUTION INTRA-ARTICULAR; INTRALESIONAL; INTRAMUSCULAR; INTRAVENOUS; SOFT TISSUE at 05:22

## 2020-06-27 RX ADMIN — CEFAZOLIN 2 G: 10 INJECTION, POWDER, FOR SOLUTION INTRAVENOUS; PARENTERAL at 04:31

## 2020-06-27 RX ADMIN — HYDROCODONE BITARTRATE AND ACETAMINOPHEN 1 TABLET: 10; 325 TABLET ORAL at 05:35

## 2020-06-27 RX ADMIN — ASPIRIN 81 MG: 81 TABLET, COATED ORAL at 05:20

## 2020-06-27 RX ADMIN — ACETAMINOPHEN 500 MG: 500 TABLET, FILM COATED ORAL at 05:20

## 2020-06-27 ASSESSMENT — COGNITIVE AND FUNCTIONAL STATUS - GENERAL
SUGGESTED CMS G CODE MODIFIER DAILY ACTIVITY: CJ
CLIMB 3 TO 5 STEPS WITH RAILING: A LITTLE
MOBILITY SCORE: 23
HELP NEEDED FOR BATHING: A LITTLE
SUGGESTED CMS G CODE MODIFIER MOBILITY: CI
DAILY ACTIVITIY SCORE: 21
TOILETING: A LITTLE
DRESSING REGULAR LOWER BODY CLOTHING: A LITTLE

## 2020-06-27 ASSESSMENT — GAIT ASSESSMENTS
DISTANCE (FEET): 150
DEVIATION: STEP TO
ASSISTIVE DEVICE: FRONT WHEEL WALKER
GAIT LEVEL OF ASSIST: SUPERVISED

## 2020-06-27 NOTE — DISCHARGE SUMMARY
Shawn Duffy was admitted on 6/26/2020 for KNEE PAIN, UNILATERAL PRIMARY OSTEOARTHRITIS RIGHT KNEE  Right knee pain  Right knee pain  Patient was diagnosed with severe degenerative arthritis in the right knee and underwent a right total knee arthroplasty by Dr. Barroso the date of admission. Please see dictated operative note for further information.    Hospital course: Had some nausea and vomiting post op.  Doing well today.  No fever or chills.  Voiding.  Tolerating diet.  Wants to go home.     The patient has done well, with no complications.  Patient denies chest pain, calf pain or shortness of breath.   Pain is well-controlled at present.  Patient is ambulating well with the use of an assistive device, and progressing in physical therapy.   Patient is neurologically and vascularly intact with palpable pedal pulses bilaterally.   Narcotic dosages were chosen by taking into account the the patient's previous history of opoid use and to ensure proper pain control after surgery    Discharge date:  6/27/20    Patient is being discharged to home after physical therapy.     Allergies:  Food and Other food       Medication List      CONTINUE taking these medications      Instructions   acetaminophen 500 MG Tabs  Commonly known as:  TYLENOL   Take 500-1,000 mg by mouth every 6 hours as needed.  Dose:  500-1,000 mg     amitriptyline 50 MG Tabs  Commonly known as:  ELAVIL   Take 1 Tab by mouth at bedtime as needed for Sleep.  Dose:  50 mg     atorvastatin 80 MG tablet  Commonly known as:  LIPITOR   Take 1 Tab by mouth every evening.  Dose:  80 mg     clopidogrel 75 MG Tabs  Commonly known as:  PLAVIX   Take 1 Tab by mouth every day.  Dose:  75 mg     Diclofenac Sodium 1 % Gel   APPLY UP TO 3 GRAMS TO BILATERAL FEET FOUR TIMES DAILY AS-NEEDED FOR PAIN/SWELLING.     HYDROcodone-acetaminophen 5-325 MG Tabs per tablet  Commonly known as:  NORCO   Take 1 Tab by mouth every four hours as needed.  Dose:  1 Tab      lisinopril 10 MG Tabs  Commonly known as:  PRINIVIL   Take 1 Tab by mouth every day.  Dose:  10 mg        STOP taking these medications    ibuprofen 200 MG Tabs  Commonly known as:  MOTRIN            Discharge Instructions:     Patient is instructed to ambulate and weight bear as tolerated with the use of an assistive device, and to continue physical therapy exercises given during this hospital stay. Strict posterior hip precautions are to be observed for patients who underwent a total hip replacement.   Patient is to ice and elevate the surgical leg regularly, with pillows under the ankle, nothing is to be placed under the knee.   Patient was given detailed wound care instructions, and will leave the Incisional vac or silver dressing on until first post-op visit.   Resume Plavix daily for DVT prophylaxis.  Patient is to follow up with Dr. Duarte office in 1-2 weeks.

## 2020-06-27 NOTE — DISCHARGE INSTRUCTIONS
Discharge Instructions    Discharged to home by car with friend. Discharged via wheelchair, hospital escort: Yes.  Special equipment needed: Not Applicable    Be sure to schedule a follow-up appointment with your primary care doctor or any specialists as instructed.     Discharge Plan:   Diet Plan: (P) Discussed  Activity Level: (P) Discussed  Confirmed Follow up Appointment: (P) Appointment Scheduled  Confirmed Symptoms Management: (P) Discussed  Medication Reconciliation Updated: (P) Yes    I understand that a diet low in cholesterol, fat, and sodium is recommended for good health. Unless I have been given specific instructions below for another diet, I accept this instruction as my diet prescription.   Other diet: Regular - as tolerated    Special Instructions: Discharge instructions for the Orthopedic Patient    Follow up with Primary Care Physician within 2 weeks of discharge to home, regarding:  Review of medications and diagnostic testing.  Surveillance for medical complications.  Workup and treatment of osteoporosis, if appropriate.     -Is this a Hip/Knee/Shoulder Joint Replacement patient? Yes Total Knee Replacement, After-Care Guidelines  These instructions provide you with information on caring for yourself and your knee after surgery. Your health care provider may also give you instructions that are more specific. Your treatment was planned and performed according to current medical practices but problems sometimes occur. Call your health care provider if you have any problems or questions.     WHAT TO EXPECT AFTER THE PROCEDURE  After your procedure, your knee will typically be stiff, sore, and bruised. This will improve over time.      Pain  - Follow your home pain management plan as discussed with your nurse and as directed by your provider.  - It is important to follow any scheduled pain medications for maximal pain relief.  - If prescribed opioid medication, the goal is to use opioids only as  needed and to wean off prescription pain medicine as soon as possible.  - Ice can be used for pain control.    o Put ice in a plastic bag.  o Place a towel between your skin and the bag.  o Leave the ice on for 20 minutes, 2-3 times a day at a minimum.  - Most patients are off the pain pills by 3 weeks.  If your pain continues to be severe, follow up with your provider.    Infection    Knee joint infections occur in fewer than 2% of patients. The most common causes of infection following total knee replacement surgery are from bacteria that enter the bloodstream during dental procedures, urinary tract infections, or skin infections. These bacteria can lodge around your knee replacement and cause an infection.  - Keep the incision as clean and dry as possible.  - Always wash your hands before touching your incision.  - Avoid dental care for 3 months after surgery. Your provider may recommend taking a dose of antibiotics an hour prior to any dental procedure. After 2 years, most providers recommend antibiotics only before an extensive procedure.  Ask your provider what they recommend.  - Signs and symptoms of infection include low-grade fever, redness, pain, swelling and drainage from your incision. Notify your provider IMMEDIATELY if you develop ANY of these symptoms.    Post op Disturbances  - Bowel habits - Constipation is extremely common and caused by a combination of anesthesia, lack of mobility, dehydration and pain medicine. Use stool softeners or laxatives if necessary. It is important not to ignore this problem as bowel obstructions can be a serious complication after joint replacement surgery.  - Mood/Energy Level - Many patients experience a lack of energy and endurance for up to 2-3 months after surgery. Some people feel down and can even become depressed. This is likely due to postoperative anemia, change in activity level, lack of sleep, pain medicine and just the emotional reaction to the surgery  itself that is a big disruption in a person’s life.  This usually passes.  If symptoms persist, follow up with your primary care provider.  - Returning to work - Your provider will give you specific instructions based on your profession.  Generally, if you work a sedentary job requiring little standing or walking, most patients may return within 2-6 weeks.  Manual labor jobs involving walking, lifting and standing may take 3-4 months.  Your provider’s office can provide a release to part-time or light duty work early on in your recovery and progress you to full duty as able.  - Driving - You can begin driving once cleared by your provider, provided you are no longer taking narcotic pain medication or any other medications that impair driving. Discuss the length of time expected with your provider as returning to driving depends on things such as your vehicle, which knee was replaced (right or left), and knee motion, strength and reflexes returning appropriately.  - Avoiding falls - A fall during the first few weeks after surgery can damage your new knee and may result in a need for further surgery.   throw rugs and tack down loose carpeting.  Be aware of floor hazards such as pets, small objects or uneven surfaces.  Notify your provider of any falls.  - [employer] Metal Detectors - The sensitivity of metal detectors varies and it is likely that your prosthesis will cause an alarm.  Inform the  of your artificial joint.    Diet  - Resume your normal diet as tolerated.  - It is important to achieve a healthy nutritional status by eating a well-balanced diet on a regular basis.  - Your provider may recommend that you take iron and vitamin supplements.   - Continue to drink plenty of fluids.    Shower/Bathing  - You may shower as soon as you get home from the hospital unless otherwise instructed.  - Keep your incision out of water to prevent infection. To keep the incision dry when showering, cover  it with a plastic bag or plastic wrap. If your bandage is waterproof, this may not be necessary.  o Pat incision dry if it gets wet. Do not rub. Notify your provider.  - Do not submerge in a bath until cleared by your provider.  Your staples must be out and the incision completely healed.     Dressing Change:  Only change your dressing if directed by your provider.  - Wash hands.  - Open all dressing change materials.  - Remove old dressing and discard.  - Inspect incision for signs of irritation or infection including redness, increase in clear drainage, yellow/green drainage, odor and surrounding skin hot to touch.  Notify your provider if present.  -  the new dressing by one corner and lay over the incision.  Be careful not to touch the inside of the dressing that will lay over the incision.  - Secure in place as instructed.    Swelling/Bruising  - Swelling is normal after knee replacement and can involve the thigh, knee, calf and foot.  - Swelling can last from 3-6 months.  - To reduce swelling, elevate your leg higher than your heart while reclining.  The first week you are home you should elevate your leg an equal amount of time as you are active.    - The swelling is usually worse after you go home since you are upright for longer periods of time.  - Bruising often does not appear until after you arrive home and can be quite dramatic- appearing purple, black, or green.  Bruising is typically not concerning and will subside without any treatment.    Blood Clot Prevention  Your treatment plan includes multiple preventative measures to decrease the risk of blood clots in the legs (DVTs) and the less common, but serious, clots that travel to the lungs (pulmonary emboli). Most patients are at standard risk for them, but people who are at higher risk include those who have had previous clots, a family history of clotting, smoking, diabetes, obesity, advanced age, use estrogen and/or live a sedentary  lifestyle.    - Signs of blood clots in legs include - Swelling in thigh, calf or ankle that does not go down with elevation.  Pain, heat and tenderness in calf, back of calf or groin area.  NOTE: blood clots can occur in either leg.  - Signs of blood clots in lungs include - Sudden increased shortness of breath, sudden onset of chest pain, and localized chest pain with coughing.  - If you experience any of the above symptoms, notify your provider and seek medical attention immediately.  - You received anticoagulant therapy (blood thinners) in the hospital.  Continue the prescribed blood-thinning medication at home, as directed by your provider.   - Your risk for developing a clot continues for up to 2-3 months after surgery.  Avoid prolonged sitting and dehydration (long air trips and car trips).  If you do take a trip during this time, please get up, move around every 1-1.5 hours, and discuss all travel plans with your provider.    Activity  Once home, stay active. The key is not to overdo it.  While you can expect some good days and some bad days, you should notice a gradual improvement and a gradual increase in your endurance over the next 6 to 12 months. Exercise is a critical component of recovery, particularly during the first few weeks after surgery.     - Normal activities of daily living - Expect to resume most within 3 to 6 weeks following surgery. Some pain with activity and at night is common for several weeks after surgery.  Walk as much as you like once your doctor gives permission to proceed, but remember that walking is no substitute for the exercises your doctor and physical therapist prescribe. Use a walker, crutches or cane to assist with walking until you can walk smoothly (minimal or no limp) without assistance.   o Physical Therapy Exercises - Follow your home exercise program as instructed by your physical therapist during your hospital stay.  Call and set up outpatient physical therapy  appointments per your provider’s recommendations.  Physical therapy after the hospital stay focuses on increasing your range of motion, strengthening your muscles and improving your gait/walking pattern.  Contact your provider for the referral to outpatient physical therapy if you have not yet received this.    - Riding a stationary bicycle can help maintain muscle tone and keep your knee flexible.  Begin stationary bicycling as directed by your physical therapist or provider.  - Sexual Activity - Your provider can tell you when it safe to resume sexual activity.    - Sleeping Positions - You can safely sleep on your back, on either side, or on your stomach.   - Other Activities - Lower impact activities are preferred.  Consult your provider if you have specific questions.    When to Call the Doctor   Call the provider if you experience:   - Fever over 100.5° F  - Increased pain, drainage, redness, odor or heat around the incision area  - Shaking chills  - Increased knee pain with activity and rest  - Increased pain in calf, tenderness or redness above or below the knee  - Increased swelling of calf, ankle, foot  - Sudden increased shortness of breath, sudden onset of chest pain, localized chest pain with coughing  - Incision opening  Or, if there are any questions or concerns about medications or care.    Infection statistic resource:  https://www.[employer]/contents/prosthetic-joint-infection-epidemiology-microbiology-clinical-manifestations-and-diagnosis    -Is this patient being discharged with medication to prevent blood clots?  Yes, Aspirin Aspirin, ASA oral tablets  What is this medicine?  ASPIRIN (AS pir in) is a pain reliever. It is used to treat mild pain and fever. This medicine is also used as directed by a doctor to prevent and to treat heart attacks, to prevent strokes and blood clots, and to treat arthritis or inflammation.  This medicine may be used for other purposes; ask your health care provider  or pharmacist if you have questions.  COMMON BRAND NAME(S): Aspir-Low, Aspir-Jacqui, Aspirtab, Domingo Advanced Aspirin, Domingo Aspirin, Domingo Aspirin Extra Strength, Domingo Aspirin Plus, Domingo Extra Strength, Domingo Extra Strength Plus, Domingo Genuine Aspirin, Domingo Womens Aspirin, Bufferin, Bufferin Extra Strength, Bufferin Low Dose  What should I tell my health care provider before I take this medicine?  They need to know if you have any of these conditions:  · anemia  · asthma  · bleeding problems  · child with chickenpox, the flu, or other viral infection  · diabetes  · gout  · if you frequently drink alcohol containing drinks  · kidney disease  · liver disease  · low level of vitamin K  · lupus  · smoke tobacco  · stomach ulcers or other problems  · an unusual or allergic reaction to aspirin, tartrazine dye, other medicines, dyes, or preservatives  · pregnant or trying to get pregnant  · breast-feeding  How should I use this medicine?  Take this medicine by mouth with a glass of water. Follow the directions on the package or prescription label. You can take this medicine with or without food. If it upsets your stomach, take it with food. Do not take your medicine more often than directed.  Talk to your pediatrician regarding the use of this medicine in children. While this drug may be prescribed for children as young as 12 years of age for selected conditions, precautions do apply. Children and teenagers should not use this medicine to treat chicken pox or flu symptoms unless directed by a doctor.  Patients over 65 years old may have a stronger reaction and need a smaller dose.  Overdosage: If you think you have taken too much of this medicine contact a poison control center or emergency room at once.  NOTE: This medicine is only for you. Do not share this medicine with others.  What if I miss a dose?  If you are taking this medicine on a regular schedule and miss a dose, take it as soon as you can. If it is almost  time for your next dose, take only that dose. Do not take double or extra doses.  What may interact with this medicine?  Do not take this medicine with any of the following medications:  · cidofovir  · ketorolac  · probenecid  This medicine may also interact with the following medications:  · alcohol  · alendronate  · bismuth subsalicylate  · flavocoxid  · herbal supplements like feverfew, garlic, gretchen, ginkgo biloba, horse chestnut  · medicines for diabetes or glaucoma like acetazolamide, methazolamide  · medicines for gout  · medicines that treat or prevent blood clots like enoxaparin, heparin, ticlopidine, warfarin  · other aspirin and aspirin-like medicines  · NSAIDs, medicines for pain and inflammation, like ibuprofen or naproxen  · pemetrexed  · sulfinpyrazone  · varicella live vaccine  This list may not describe all possible interactions. Give your health care provider a list of all the medicines, herbs, non-prescription drugs, or dietary supplements you use. Also tell them if you smoke, drink alcohol, or use illegal drugs. Some items may interact with your medicine.  What should I watch for while using this medicine?  If you are treating yourself for pain, tell your doctor or health care professional if the pain lasts more than 10 days, if it gets worse, or if there is a new or different kind of pain. Tell your doctor if you see redness or swelling. Also, check with your doctor if you have a fever that lasts for more than 3 days. Only take this medicine to prevent heart attacks or blood clotting if prescribed by your doctor or health care professional.  Do not take aspirin or aspirin-like medicines with this medicine. Too much aspirin can be dangerous. Always read the labels carefully.  This medicine can irritate your stomach or cause bleeding problems. Do not smoke cigarettes or drink alcohol while taking this medicine. Do not lie down for 30 minutes after taking this medicine to prevent irritation to  your throat.  If you are scheduled for any medical or dental procedure, tell your healthcare provider that you are taking this medicine. You may need to stop taking this medicine before the procedure.  This medicine may be used to treat migraines. If you take migraine medicines for 10 or more days a month, your migraines may get worse. Keep a diary of headache days and medicine use. Contact your healthcare professional if your migraine attacks occur more frequently.  What side effects may I notice from receiving this medicine?  Side effects that you should report to your doctor or health care professional as soon as possible:  · allergic reactions like skin rash, itching or hives, swelling of the face, lips, or tongue  · breathing problems  · changes in hearing, ringing in the ears  · confusion  · general ill feeling or flu-like symptoms  · pain on swallowing  · redness, blistering, peeling or loosening of the skin, including inside the mouth or nose  · signs and symptoms of bleeding such as bloody or black, tarry stools; red or dark-brown urine; spitting up blood or brown material that looks like coffee grounds; red spots on the skin; unusual bruising or bleeding from the eye, gums, or nose  · trouble passing urine or change in the amount of urine  · unusually weak or tired  · yellowing of the eyes or skin  Side effects that usually do not require medical attention (report to your doctor or health care professional if they continue or are bothersome):  · diarrhea or constipation  · headache  · nausea, vomiting  · stomach gas, heartburn  This list may not describe all possible side effects. Call your doctor for medical advice about side effects. You may report side effects to FDA at 4-803-FDA-3334.  Where should I keep my medicine?  Keep out of the reach of children.  Store at room temperature between 15 and 30 degrees C (59 and 86 degrees F). Protect from heat and moisture. Do not use this medicine if it has a  strong vinegar smell. Throw away any unused medicine after the expiration date.  NOTE: This sheet is a summary. It may not cover all possible information. If you have questions about this medicine, talk to your doctor, pharmacist, or health care provider.  © 2020 [employer] (2018-01-30 10:42:13)      · Is patient discharged on Warfarin / Coumadin?   No     Depression / Suicide Risk    As you are discharged from this [facility] facility, it is important to learn how to keep safe from harming yourself.    Recognize the warning signs:  · Abrupt changes in personality, positive or negative- including increase in energy   · Giving away possessions  · Change in eating patterns- significant weight changes-  positive or negative  · Change in sleeping patterns- unable to sleep or sleeping all the time   · Unwillingness or inability to communicate  · Depression  · Unusual sadness, discouragement and loneliness  · Talk of wanting to die  · Neglect of personal appearance   · Rebelliousness- reckless behavior  · Withdrawal from people/activities they love  · Confusion- inability to concentrate     If you or a loved one observes any of these behaviors or has concerns about self-harm, here's what you can do:  · Talk about it- your feelings and reasons for harming yourself  · Remove any means that you might use to hurt yourself (examples: pills, rope, extension cords, firearm)  · Get professional help from the community (Mental Health, Substance Abuse, psychological counseling)  · Do not be alone:Call your Safe Contact- someone whom you trust who will be there for you.  · Call your local CRISIS HOTLINE 728-4933 or 690-359-8666  · Call your local Children's Mobile Crisis Response Team [place] (333) 702-8225 or www.[employer]  · Call the toll free National Suicide Prevention Hotlines   · National Suicide Prevention Lifeline 673-350-ERPL (6661)  · National Hope Line Network 800-SUICIDE (621-7008)      Notes  from Dr. Johnston:   ICE AND ELEVATE EVERY 2-3 HOURS WHILE AWAKE. DO HOME EXERCISES 3X A DAY. AMBULATE REGULARLY, NO SITTING MORE THAN 1.5 HRS.   LEAVE INCISION IN PLACE  OKAY TO SHOWER WITH INCISION COVERED

## 2020-06-27 NOTE — FLOWSHEET NOTE
06/27/20 0241   Events/Summary/Plan   Events/Summary/Plan cont home cpap in place   Vital Signs   Pulse 84   Respiration 18   Pulse Oximetry 95 %   $ Pulse Oximetry (Spot Check) Yes   Respiratory Assessment   Level of Consciousness   (asleep)   Chest Exam   Work Of Breathing / Effort Mild   Oxygen   O2 (LPM) 0   O2 Delivery Device CPAP   Non-Invasive Ventilation [employer]   Nocturnal CPAP or BIPAP CPAP - Home Unit   Settings (If Known) auto   FiO2 or LPM 0

## 2020-06-27 NOTE — PROGRESS NOTES
Received report from night shift RN. Assumed care of pt. Patient resting comfortably in bed. AOx4. Reported no pain and no n/v. Pain well managed with ice and medications. VS stable.

## 2020-06-27 NOTE — FLOWSHEET NOTE
06/26/20 2235   Events/Summary/Plan   Events/Summary/Plan Home cpap s/u and ready   Vital Signs   Pulse 68   Respiration 18   Pulse Oximetry 95 %   $ Pulse Oximetry (Spot Check) Yes   Respiratory Assessment   Level of Consciousness Alert   Chest Exam   Work Of Breathing / Effort Mild   Oxygen   O2 Delivery Device CPAP  (stand-by, pt awake and on phone)   Non-Invasive Ventilation [employer]   Nocturnal CPAP or BIPAP CPAP - Home Unit   Settings (If Known) auto   FiO2 or LPM 0

## 2020-06-27 NOTE — DIETARY
NUTRITION SERVICES: BMI - Pt with BMI >40 (=Body mass index is 44.9 kg/m².), morbid obesity. Weight loss counseling not appropriate in acute care setting. RECOMMEND - Referral to outpatient nutrition services for weight management as appropriate after D/C.

## 2020-06-27 NOTE — THERAPY
Occupational Therapy  Daily Treatment     Patient Name: Shawn Duffy  Age:  60 y.o., Sex:  male  Medical Record #: [id number]  Today's Date: 6/27/2020     Precautions  Precautions: Weight Bearing As Tolerated Right Lower Extremity    Assessment    Pt with an increase in function and activity tolerance this date. Wife and Pt were educated regarding techniques for ADLS and functional mobility, post surgery and were able to verbalize understanding and return demonstrate techniques. He is safe to d/c home with family assist.      Plan    Discharge secondary to goals met.    Discharge recommendations:  Anticipate that the patient will have no further occupational therapy needs after discharge from the hospital.        06/27/20 0859   Precautions   Precautions Weight Bearing As Tolerated Right Lower Extremity   Pain 0 - 10 Group   Therapist Pain Assessment During Activity;Nurse Notified;4   Cognition    Cognition / Consciousness WDL   Level of Consciousness Alert   Comments Pleasant and cooperative   Balance   Sitting Balance (Static) Good   Sitting Balance (Dynamic) Good   Standing Balance (Static) Fair +   Standing Balance (Dynamic) Fair +   Weight Shift Sitting Good   Weight Shift Standing Good   Bed Mobility    Supine to Sit Supervised   Scooting Modified Independent   Skilled Intervention Verbal Cuing   Activities of Daily Living   Eating Independent   Grooming Supervision;Standing   Upper Body Dressing Independent   Lower Body Dressing Supervision   Toileting Supervision   Skilled Intervention Verbal Cuing   Functional Mobility   Sit to Stand Supervised   Bed, Chair, Wheelchair Transfer Supervised   Toilet Transfers Supervised   Transfer Method Stand Pivot   Mobility FWW   Skilled Intervention Verbal Cuing   Activity Tolerance   Sitting in Chair >1 hr   Sitting Edge of Bed 10 min   Standing 10 min   Patient / Family Goals   Patient / Family Goal #1 home   Short Term Goals   Short Term Goal # 1 pt will perform ADL  transfers with Sup within 2 days    Goal Outcome # 1 Goal met   Short Term Goal # 2 pt will perform FB drsg with Sup within 2 days     Goal Outcome # 2 Goal met   Short Term Goal # 3 pt will perform grooming at sink with Sup within 2 days   Goal Outcome # 3 Goal met

## 2020-06-27 NOTE — CARE PLAN
Problem: Communication  Goal: The ability to communicate needs accurately and effectively will improve  Outcome: PROGRESSING AS EXPECTED     Problem: Safety  Goal: Will remain free from falls  Outcome: PROGRESSING AS EXPECTED     Problem: Infection  Goal: Will remain free from infection  Outcome: PROGRESSING AS EXPECTED     Problem: Venous Thromboembolism (VTW)/Deep Vein Thrombosis (DVT) Prevention:  Goal: Patient will participate in Venous Thrombosis (VTE)/Deep Vein Thrombosis (DVT)Prevention Measures  Outcome: PROGRESSING AS EXPECTED     Problem: Pain Management  Goal: Pain level will decrease to patient's comfort goal  Outcome: PROGRESSING AS EXPECTED

## 2020-06-27 NOTE — THERAPY
Physical Therapy   Initial Evaluation     Patient Name: Shawn Duffy  Age:  60 y.o., Sex:  male  Medical Record #: [id number]  Today's Date: 6/27/2020     Precautions: (P) Weight Bearing As Tolerated Right Lower Extremity    Assessment  Patient is 60 y.o. male with a diagnosis of R TKR.Pt lives at home with wife and is active.Pt is safe with transfers,ambulation and stairs he understands HEP and has no equipment needs.      Plan    Recommend Physical Therapy for Evaluation only      06/27/20 0900   Prior Living Situation   Prior Services None   Housing / Facility 1 Story House   Steps Into Home 3   Steps In Home 0   Equipment Owned Single Point Cane;Crutches   Lives with - Patient's Self Care Capacity Significant Other   Prior Level of Functional Mobility   Bed Mobility Independent   Transfer Status Independent   Ambulation Independent   Distance Ambulation (Feet)   (community amb)   Assistive Devices Used None   Stairs Independent   Passive ROM Lower Body   Rt Knee Flexion Degrees 90   Rt Knee Extension Degrees 0   Balance Assessment   Sitting Balance (Static) Good   Sitting Balance (Dynamic) Good   Standing Balance (Static) Fair +   Standing Balance (Dynamic) Fair +   Weight Shift Sitting Good   Weight Shift Standing Good   Gait Analysis   Gait Level Of Assist Supervised   Assistive Device Front Wheel Walker   Distance (Feet) 150   # of Times Distance was Traveled 2   Deviation Step To   # of Stairs Climbed 3   Level of Assist with Stairs Minimal Assist   Weight Bearing Status wbat R   Patient / Family Goals    Patient / Family Goal #1 Home   Anticipated Discharge Equipment   DC Equipment None       Discharge recommendations:  Recommend outpatient physical therapy services to address higher level deficits.           Patient

## 2020-06-27 NOTE — PROGRESS NOTES
Received report from day shift. Assumed care of pt. Pt a&ox 4 and laying in bed. No pain at this time. VSS with reported elevation of BP during the day, will continue to monitor. No numbness or tingling in extremities. 5/5 strength in all extremities bilaterally. CMS intact on right leg. Dressing CDI on right knee. No drainage in prevena. No nausea at this time. Bed locked and in lowest position. POC discussed with pt. Call light within reach. No further needs at this time.

## 2020-06-27 NOTE — PROGRESS NOTES
Patient reports no N / V with fluids and solids. Voiding. Pain managed well managed with ice and pain meds. VSS. Patient discharged home with wife.

## 2020-06-27 NOTE — THERAPY
Occupational Therapy   Initial Evaluation     Patient Name: Shawn Duffy  Age:  60 y.o., Sex:  male  Medical Record #: [id number]  Today's Date: 6/26/2020     Precautions:  Weight Bearing As Tolerated Right Lower Extremity    Assessment  Patient is 60 y.o. male with a diagnosis of R TKA. Lives with SO who is present during eval. Pt eager for home today, although N/V limiting activity tolerance, safety with functional mobility and ADL's. Walks to BR with FWW, García. Toileting with Sup. Becomes lightheaded and nauseous; BTB.       Plan    Recommend Occupational Therapy 2 times per week until therapy goals are met for the following treatments:  Neuro Re-Education / Balance and Self Care/Activities of Daily Living.    Discharge recommendations:  Anticipate that the patient will have no further occupational therapy needs after discharge from the hospital.      06/26/20 1650   Prior Living Situation   Prior Services None   Housing / Facility 1 Story House   Steps Into Home 3   Steps In Home 0   Bathroom Set up Bathtub / Shower Combination;Shower Chair   Equipment Owned Single Point Cane;Crutches;4-Wheel Walker;Tub / Shower Seat   Lives with - Patient's Self Care Capacity Significant Other   Comments tub/shower combo has a built-in seat; discussed shower safety and pt may need to obtain tub bench or sh seat.      Prior Level of ADL Function   Self Feeding Independent   Grooming / Hygiene Independent   Bathing Independent   Dressing Independent   Toileting Independent   Prior Level of IADL Function   Medication Management Independent   Laundry Independent   Kitchen Mobility Independent   Finances Independent   Home Management Independent   Shopping Independent   Prior Level Of Mobility Independent Without Device in Home   Balance Assessment   Sitting Balance (Static) Good   Sitting Balance (Dynamic) Fair +   Standing Balance (Static) Fair   Standing Balance (Dynamic) Fair   Weight Shift Sitting Good   Weight Shift Standing  Fair   Bed Mobility    Supine to Sit Minimal Assist   Sit to Supine Minimal Assist   Scooting Supervised   ADL Assessment   Eating Independent   Grooming Supervision   Upper Body Dressing Independent   Toileting Supervision   Functional Mobility   Sit to Stand Supervised   Bed, Chair, Wheelchair Transfer Minimal Assist   Toilet Transfers Minimal Assist   Short Term Goals   Short Term Goal # 1 pt will perform ADL transfers with Sup within 2 days    Short Term Goal # 2 pt will perform FB drsg with Sup within 2 days     Short Term Goal # 3 pt will perform grooming at sink with Sup within 2 days

## 2020-08-17 ENCOUNTER — OFFICE VISIT (OUTPATIENT)
Dept: MEDICAL GROUP | Facility: PHYSICIAN GROUP | Age: 60
End: 2020-08-17
Payer: COMMERCIAL

## 2020-08-17 VITALS
HEIGHT: 65 IN | HEART RATE: 88 BPM | BODY MASS INDEX: 44.98 KG/M2 | SYSTOLIC BLOOD PRESSURE: 130 MMHG | WEIGHT: 270 LBS | DIASTOLIC BLOOD PRESSURE: 76 MMHG | OXYGEN SATURATION: 96 % | TEMPERATURE: 98 F

## 2020-08-17 DIAGNOSIS — I10 ESSENTIAL HYPERTENSION: ICD-10-CM

## 2020-08-17 DIAGNOSIS — Z23 NEED FOR VACCINATION: ICD-10-CM

## 2020-08-17 DIAGNOSIS — I65.1 BASILAR ARTERY OCCLUSION: ICD-10-CM

## 2020-08-17 DIAGNOSIS — F51.01 PRIMARY INSOMNIA: ICD-10-CM

## 2020-08-17 DIAGNOSIS — Z96.651 STATUS POST TOTAL KNEE REPLACEMENT, RIGHT: ICD-10-CM

## 2020-08-17 PROCEDURE — 90471 IMMUNIZATION ADMIN: CPT | Performed by: PHYSICIAN ASSISTANT

## 2020-08-17 PROCEDURE — 99214 OFFICE O/P EST MOD 30 MIN: CPT | Mod: 25 | Performed by: PHYSICIAN ASSISTANT

## 2020-08-17 PROCEDURE — 90750 HZV VACC RECOMBINANT IM: CPT | Performed by: PHYSICIAN ASSISTANT

## 2020-08-17 RX ORDER — ONDANSETRON 4 MG/1
TABLET, FILM COATED ORAL
COMMUNITY
Start: 2020-06-25 | End: 2020-08-17

## 2020-08-17 RX ORDER — TRAMADOL HYDROCHLORIDE 50 MG/1
TABLET ORAL
COMMUNITY
Start: 2020-08-07 | End: 2021-02-23

## 2020-08-17 RX ORDER — HYDROCODONE BITARTRATE AND ACETAMINOPHEN 10; 325 MG/1; MG/1
TABLET ORAL
COMMUNITY
Start: 2020-08-07 | End: 2020-08-17

## 2020-08-17 ASSESSMENT — FIBROSIS 4 INDEX: FIB4 SCORE: 1.16

## 2020-08-17 NOTE — PROGRESS NOTES
Subjective:   Shawn Duffy is a 60 y.o. male here today for follow-up on hypertension and other medical problems. Is an established patient of mine.    HPI:    Patient presents to the office today for follow-up on chronic conditions. Last visit with me was in February 2020. He is requesting refills of his medications.  At the time of the last appointment, he had increased his lisinopril dose to 10 mg daily due to high readings at home.  He continues to take this.  Blood pressure today in the office is 130/76.  Since last visit, he has followed up with neurology.  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  He is on Plavix.    He underwent right knee replacement surgery back in June. Feels he is doing very well. Had quite a bit of pain right after the surgery but that has improved significantly as of late. States hasn't taken any Norco or tramadol in the last two days. Has started to increase his physical activity--doing a lot of walking.    He was started on low-dose doxepin at the last appointment as alternative for amitriptyline which he did not find helpful so went back to the amitriptyline.  Patient suffers from chronic insomnia.  He does have obstructive sleep apnea which is treated with CPAP.  Follows with sleep medicine for this.        Current medicines (including changes today)  Current Outpatient Medications   Medication Sig Dispense Refill   • tramadol (ULTRAM) 50 MG Tab TAKE 1 TABLET BY MOUTH EVERY 4 HOURS AS NEEDED FOR MILD PAIN *Z96.652     • acetaminophen (TYLENOL) 500 MG Tab Take 500-1,000 mg by mouth every 6 hours as needed.     • clopidogrel (PLAVIX) 75 MG Tab Take 1 Tab by mouth every day. 90 Tab 3   • atorvastatin (LIPITOR) 80 MG tablet Take 1 Tab by mouth every evening. 90 Tab 3   • lisinopril (PRINIVIL) 10 MG Tab Take 1 Tab by mouth every day. 90 Tab 1   • amitriptyline (ELAVIL) 50 MG Tab Take 1 Tab by mouth at bedtime as needed  "for Sleep. 90 Tab 3     No current facility-administered medications for this visit.      He  has a past medical history of Arthritis, Eosinophilic esophagitis (6/22/2015), GERD (gastroesophageal reflux disease), Hypertension, Obesity, unspecified, Other chronic nonalcoholic liver disease, Sleep apnea (12/22/2009), and Stroke (HCC) (11/2019).    ROS  No chest pain  No SOB  No headaches, dizziness/lightheadedness       Objective:     /76 (BP Location: Right arm, Patient Position: Sitting, BP Cuff Size: Adult)   Pulse 88   Temp 36.7 °C (98 °F) (Temporal)   Ht 1.651 m (5' 5\")   Wt 122.5 kg (270 lb)   SpO2 96%  Body mass index is 44.93 kg/m².     Physical Exam:  Constitutional: Alert, obese but otherwise well-appearing, no distress.  Skin: No rashes in visible areas.  Eye: Pupils are equal and round, conjunctiva clear, lids normal.  ENMT: Lips without lesions, moist mucus membranes.  Neck: obese, no obvious visible masses.  Respiratory: Unlabored respiratory effort, lungs clear to auscultation, no wheezes, no rhonchi.  Cardiovascular: Normal S1, S2, no murmur, no lower extremity edema.      Assessment and Plan:   The following treatment plan was discussed    1. Essential hypertension  Established problem, well-controlled with lisinopril 10 mg daily.  Continue current management.    2. Basilar artery occlusion  Established problem, stable.  Advised to continue Plavix and atorvastatin.  Per review of neurology note, 1 year follow-up was recommended which would be May 2021.    3. Status post total knee replacement, right  Doing well after recovering from right knee replacement.  We will continue to increase his physical activity.  Recommend that he transition to taking over-the-counter acetaminophen as needed for pain relief.    4. Primary insomnia  Established problem, reasonably well-controlled with amitriptyline which works better for him than the doxepin.  Continue current management.    5. Need for " vaccination  Patient is agreeable with completing Shingrix vaccination today.  He was counseled on possible side effects including injection site pain/itching/redness/swelling as well as potential for mild flulike symptoms including body aches, chills, low-grade fever.  - Shingles Vaccine (Shingrix)      Followup: Return for establish care with new PCP.    Shilpa Roth P.A.-C.

## 2020-09-22 DIAGNOSIS — I10 ESSENTIAL HYPERTENSION: ICD-10-CM

## 2020-09-23 ENCOUNTER — HOSPITAL ENCOUNTER (OUTPATIENT)
Dept: LAB | Facility: MEDICAL CENTER | Age: 60
End: 2020-09-23
Payer: COMMERCIAL

## 2020-09-23 LAB
COVID ORDER STATUS COVID19: NORMAL
SARS-COV-2 RNA RESP QL NAA+PROBE: NOTDETECTED
SPECIMEN SOURCE: NORMAL

## 2020-09-24 RX ORDER — LISINOPRIL 10 MG/1
10 TABLET ORAL DAILY
Qty: 90 TAB | Refills: 1 | Status: SHIPPED | OUTPATIENT
Start: 2020-09-24 | End: 2021-02-23 | Stop reason: SDUPTHER

## 2020-09-24 NOTE — TELEPHONE ENCOUNTER
Refill X 6 months, sent to pharmacy.Pt. Seen in the last 6 months per protocol.   Lab Results   Component Value Date/Time    SODIUM 143 06/22/2020 08:45 AM    POTASSIUM 4.3 06/22/2020 08:45 AM    CHLORIDE 107 06/22/2020 08:45 AM    CO2 26 06/22/2020 08:45 AM    GLUCOSE 103 (H) 06/22/2020 08:45 AM    BUN 14 06/22/2020 08:45 AM    CREATININE 0.78 06/22/2020 08:45 AM    CREATININE 0.9 04/14/2009 11:43 AM

## 2020-10-19 ENCOUNTER — OFFICE VISIT (OUTPATIENT)
Dept: MEDICAL GROUP | Facility: PHYSICIAN GROUP | Age: 60
End: 2020-10-19
Payer: COMMERCIAL

## 2020-10-19 VITALS
HEIGHT: 65 IN | SYSTOLIC BLOOD PRESSURE: 120 MMHG | OXYGEN SATURATION: 96 % | WEIGHT: 277 LBS | DIASTOLIC BLOOD PRESSURE: 82 MMHG | HEART RATE: 83 BPM | BODY MASS INDEX: 46.15 KG/M2 | TEMPERATURE: 98.3 F

## 2020-10-19 DIAGNOSIS — Z12.11 SCREEN FOR COLON CANCER: ICD-10-CM

## 2020-10-19 DIAGNOSIS — Z12.5 ENCOUNTER FOR SCREENING FOR MALIGNANT NEOPLASM OF PROSTATE: ICD-10-CM

## 2020-10-19 DIAGNOSIS — Z13.29 SCREENING FOR THYROID DISORDER: ICD-10-CM

## 2020-10-19 DIAGNOSIS — I65.1 BASILAR ARTERY OCCLUSION: ICD-10-CM

## 2020-10-19 DIAGNOSIS — Z13.228 SCREENING FOR METABOLIC DISORDER: ICD-10-CM

## 2020-10-19 DIAGNOSIS — Z00.00 ROUTINE ADULT HEALTH MAINTENANCE: ICD-10-CM

## 2020-10-19 DIAGNOSIS — E83.110 HEREDITARY HEMOCHROMATOSIS (HCC): ICD-10-CM

## 2020-10-19 DIAGNOSIS — Z13.6 SCREENING FOR CARDIOVASCULAR CONDITION: ICD-10-CM

## 2020-10-19 DIAGNOSIS — R79.89 LOW VITAMIN D LEVEL: ICD-10-CM

## 2020-10-19 DIAGNOSIS — Z23 NEED FOR VACCINATION: ICD-10-CM

## 2020-10-19 DIAGNOSIS — G47.33 OBSTRUCTIVE SLEEP APNEA SYNDROME: ICD-10-CM

## 2020-10-19 DIAGNOSIS — I63.9 CEREBROVASCULAR ACCIDENT (CVA), UNSPECIFIED MECHANISM (HCC): ICD-10-CM

## 2020-10-19 DIAGNOSIS — Z13.21 ENCOUNTER FOR VITAMIN DEFICIENCY SCREENING: ICD-10-CM

## 2020-10-19 PROCEDURE — 90686 IIV4 VACC NO PRSV 0.5 ML IM: CPT | Performed by: NURSE PRACTITIONER

## 2020-10-19 PROCEDURE — 90750 HZV VACC RECOMBINANT IM: CPT | Performed by: NURSE PRACTITIONER

## 2020-10-19 PROCEDURE — 90471 IMMUNIZATION ADMIN: CPT | Performed by: NURSE PRACTITIONER

## 2020-10-19 PROCEDURE — 99214 OFFICE O/P EST MOD 30 MIN: CPT | Mod: 25 | Performed by: NURSE PRACTITIONER

## 2020-10-19 PROCEDURE — 90472 IMMUNIZATION ADMIN EACH ADD: CPT | Performed by: NURSE PRACTITIONER

## 2020-10-19 SDOH — HEALTH STABILITY: MENTAL HEALTH: HOW OFTEN DO YOU HAVE 6 OR MORE DRINKS ON ONE OCCASION?: NEVER

## 2020-10-19 SDOH — HEALTH STABILITY: MENTAL HEALTH: HOW OFTEN DO YOU HAVE A DRINK CONTAINING ALCOHOL?: MONTHLY OR LESS

## 2020-10-19 SDOH — HEALTH STABILITY: MENTAL HEALTH: HOW MANY STANDARD DRINKS CONTAINING ALCOHOL DO YOU HAVE ON A TYPICAL DAY?: 1 OR 2

## 2020-10-19 ASSESSMENT — FIBROSIS 4 INDEX: FIB4 SCORE: 1.16

## 2020-10-19 NOTE — PROGRESS NOTES
Chief Complaint   Patient presents with   • Establish Care   • Immunizations         Subjective:     Shawn Duffy is a 60 y.o. male presenting to establish care.    CVA: Chronic, controlled.  Patient experienced CVA with basilar artery occlusion last year.  Did check in with neurology once but did not complete stroke Bridge clinic as was recommended.  Currently on 75 mg clopidogrel daily.  PCP was managing this before.  Does have a history of sleep apnea as well as hereditary hemochromatosis.    Osteoarthritis: Chronic, uncontrolled.  Patient underwent total knee arthroplasty of the right knee in June 2020.  Scheduled to check in with his orthopedic surgeon January 2021 to evaluate appropriateness for the left.    Hypertension: Chronic, controlled.  Tolerating 10 mg of lisinopril well.  Not experiencing dry cough, abnormal swelling, negative side effects.    Patient overdue for routine fasting labs as well as colonoscopy.      Review of systems:      Denies chest pain, shortness of breath, sore throat, difficulty swallowing, new cough, dizziness, severe headache, altered cognition, changes in bowel or bladder habits, decreased sensation, decreased strength, numbness or tingling, intolerable depression or anxiety, rash or skin concerns, changes in vision, fatigue, painful or swollen lymph nodes.       Current Outpatient Medications:   •  lisinopril (PRINIVIL) 10 MG Tab, Take 1 Tab by mouth every day., Disp: 90 Tab, Rfl: 1  •  tramadol (ULTRAM) 50 MG Tab, TAKE 1 TABLET BY MOUTH EVERY 4 HOURS AS NEEDED FOR MILD PAIN *Z96.652, Disp: , Rfl:   •  clopidogrel (PLAVIX) 75 MG Tab, Take 1 Tab by mouth every day., Disp: 90 Tab, Rfl: 3  •  atorvastatin (LIPITOR) 80 MG tablet, Take 1 Tab by mouth every evening., Disp: 90 Tab, Rfl: 3  •  amitriptyline (ELAVIL) 50 MG Tab, Take 1 Tab by mouth at bedtime as needed for Sleep., Disp: 90 Tab, Rfl: 3  •  acetaminophen (TYLENOL) 500 MG Tab, Take 500-1,000 mg by mouth every 6 hours  "as needed., Disp: , Rfl:     Allergies, past medical history, past surgical history, family history, social history reviewed and updated    Objective:     Vitals: /82 (BP Location: Right arm, Patient Position: Sitting, BP Cuff Size: Large adult)   Pulse 83   Temp 36.8 °C (98.3 °F) (Temporal)   Ht 1.651 m (5' 5\")   Wt (!) 125.6 kg (277 lb)   SpO2 96%   BMI 46.10 kg/m²   General: Alert, cooperative, dressed appropriately for weather / situation  Eyes:Normocephalic.  EOMI, no icterus or pallor.  Conjunctivae clear without erythema / irritation.  ENT:  External ears developed; Bilat TMs visualized; appear pearly without bulging, effusion, or erythema; crisp light reflex.     Lymph: Neck supple, absent of cervical or supraclavicular lymphadenopathy.  Thyroid palpated, free of masses or goiter.   Heart: Regular rate and rhythm.  S1 and S2 normal.  No murmurs auscultated; no murmurs / bruits heard over bilateral carotids.  Bilateral radial pulses strong and equal.  Bilateral posterior tibial pulses strong and equal.  Respiratory: Normal respiratory effort.  Clear to auscultation bilaterally.  AP ratio 1:2   Abdomen: Non-distended;   Skin: Visible skin intact, dry, without rash.  Musculoskeletal: Gait is normal.  Bilateral  strength strong equal.  Moves extremities freely and equally bilaterally  Neuro:  AAOx3 Visual tracking intact, no nystagmus;   Psych:  Affect/mood is normal, judgement is good, memory is intact, grooming is appropriate.    Assessment/Plan:     Shawn was seen today for establish care and immunizations.    Diagnoses and all orders for this visit:    Referral placed to neurology stroke Bridge clinic.  Encourage patient to complete this program to ensure appropriate post CVA care and referrals been made.    Updated labs indicated, orders placed.  Overdue for colon cancer screening.  Currently patient's focuses on his orthopedic surgery, but he is open and willing to catch up on other " wellness activities.    Cerebrovascular accident (CVA), unspecified mechanism (HCC)  -     REFERRAL TO NEUROLOGY  -     CBC WITH DIFFERENTIAL; Future  -     Comp Metabolic Panel; Future  -     Lipid Profile; Future  -     HEMOGLOBIN A1C; Future  -     TSH WITH REFLEX TO FT4; Future    Basilar artery occlusion  -     REFERRAL TO NEUROLOGY  -     CBC WITH DIFFERENTIAL; Future  -     Comp Metabolic Panel; Future  -     Lipid Profile; Future  -     HEMOGLOBIN A1C; Future  -     TSH WITH REFLEX TO FT4; Future    Hereditary hemochromatosis (HCC)  -     CBC WITH DIFFERENTIAL; Future    Need for vaccination  -     Influenza Vaccine Quad Injection (PF)  -     Shingrix Vaccine    Obstructive sleep apnea syndrome  -     CBC WITH DIFFERENTIAL; Future    Low vitamin D level  -     VITAMIN D,25 HYDROXY; Future    Routine adult health maintenance  -     CBC WITH DIFFERENTIAL; Future  -     Comp Metabolic Panel; Future  -     Lipid Profile; Future  -     HEMOGLOBIN A1C; Future  -     PROSTATE SPECIFIC AG SCREENING; Future  -     VITAMIN D,25 HYDROXY; Future  -     TSH WITH REFLEX TO FT4; Future    Screening for cardiovascular condition  -     CBC WITH DIFFERENTIAL; Future  -     Lipid Profile; Future    Encounter for screening for malignant neoplasm of prostate  -     PROSTATE SPECIFIC AG SCREENING; Future    Encounter for vitamin deficiency screening  -     CBC WITH DIFFERENTIAL; Future  -     Comp Metabolic Panel; Future  -     VITAMIN D,25 HYDROXY; Future    Screening for thyroid disorder  -     TSH WITH REFLEX TO FT4; Future    Screening for metabolic disorder  -     Comp Metabolic Panel; Future  -     Lipid Profile; Future  -     HEMOGLOBIN A1C; Future    Screen for colon cancer  -     OCCULT BLOOD FECES IMMUNOASSAY; Future  -     REFERRAL TO GI FOR COLONOSCOPY          Return in about 6 months (around 4/19/2021).    Patient verbalized understanding and agreed to plan of care.  Encouraged to contact me with needs via [employer]  or by phone if needed.      I have placed the above orders and discussed them with an approved delegating provider.  The MA is performing the below orders under the direction of Dr Olivier.    Please note that this dictation was created using voice recognition software. I have made every reasonable attempt to correct obvious errors, but I expect that there are errors of grammar and possibly content that I did not discover before finalizing the note.

## 2020-10-27 ENCOUNTER — HOSPITAL ENCOUNTER (OUTPATIENT)
Dept: LAB | Facility: MEDICAL CENTER | Age: 60
End: 2020-10-27
Attending: NURSE PRACTITIONER
Payer: COMMERCIAL

## 2020-10-27 DIAGNOSIS — R79.89 LOW VITAMIN D LEVEL: ICD-10-CM

## 2020-10-27 DIAGNOSIS — E83.110 HEREDITARY HEMOCHROMATOSIS (HCC): ICD-10-CM

## 2020-10-27 DIAGNOSIS — I63.9 CEREBROVASCULAR ACCIDENT (CVA), UNSPECIFIED MECHANISM (HCC): ICD-10-CM

## 2020-10-27 DIAGNOSIS — G47.33 OBSTRUCTIVE SLEEP APNEA SYNDROME: ICD-10-CM

## 2020-10-27 DIAGNOSIS — Z00.00 ROUTINE ADULT HEALTH MAINTENANCE: ICD-10-CM

## 2020-10-27 DIAGNOSIS — Z13.228 SCREENING FOR METABOLIC DISORDER: ICD-10-CM

## 2020-10-27 DIAGNOSIS — I65.1 BASILAR ARTERY OCCLUSION: ICD-10-CM

## 2020-10-27 DIAGNOSIS — Z13.29 SCREENING FOR THYROID DISORDER: ICD-10-CM

## 2020-10-27 DIAGNOSIS — Z13.6 SCREENING FOR CARDIOVASCULAR CONDITION: ICD-10-CM

## 2020-10-27 DIAGNOSIS — Z12.5 ENCOUNTER FOR SCREENING FOR MALIGNANT NEOPLASM OF PROSTATE: ICD-10-CM

## 2020-10-27 DIAGNOSIS — Z13.21 ENCOUNTER FOR VITAMIN DEFICIENCY SCREENING: ICD-10-CM

## 2020-10-27 LAB
25(OH)D3 SERPL-MCNC: 22 NG/ML (ref 30–100)
ALBUMIN SERPL BCP-MCNC: 4.2 G/DL (ref 3.2–4.9)
ALBUMIN/GLOB SERPL: 1.2 G/DL
ALP SERPL-CCNC: 128 U/L (ref 30–99)
ALT SERPL-CCNC: 22 U/L (ref 2–50)
ANION GAP SERPL CALC-SCNC: 10 MMOL/L (ref 7–16)
AST SERPL-CCNC: 16 U/L (ref 12–45)
BASOPHILS # BLD AUTO: 0.5 % (ref 0–1.8)
BASOPHILS # BLD: 0.04 K/UL (ref 0–0.12)
BILIRUB SERPL-MCNC: 1.3 MG/DL (ref 0.1–1.5)
BUN SERPL-MCNC: 12 MG/DL (ref 8–22)
CALCIUM SERPL-MCNC: 9.6 MG/DL (ref 8.5–10.5)
CHLORIDE SERPL-SCNC: 105 MMOL/L (ref 96–112)
CHOLEST SERPL-MCNC: 98 MG/DL (ref 100–199)
CO2 SERPL-SCNC: 26 MMOL/L (ref 20–33)
CREAT SERPL-MCNC: 0.8 MG/DL (ref 0.5–1.4)
EOSINOPHIL # BLD AUTO: 0.32 K/UL (ref 0–0.51)
EOSINOPHIL NFR BLD: 4.2 % (ref 0–6.9)
ERYTHROCYTE [DISTWIDTH] IN BLOOD BY AUTOMATED COUNT: 45.9 FL (ref 35.9–50)
EST. AVERAGE GLUCOSE BLD GHB EST-MCNC: 117 MG/DL
GLOBULIN SER CALC-MCNC: 3.5 G/DL (ref 1.9–3.5)
GLUCOSE SERPL-MCNC: 98 MG/DL (ref 65–99)
HBA1C MFR BLD: 5.7 % (ref 0–5.6)
HCT VFR BLD AUTO: 46.5 % (ref 42–52)
HDLC SERPL-MCNC: 38 MG/DL
HGB BLD-MCNC: 15.2 G/DL (ref 14–18)
IMM GRANULOCYTES # BLD AUTO: 0.02 K/UL (ref 0–0.11)
IMM GRANULOCYTES NFR BLD AUTO: 0.3 % (ref 0–0.9)
LDLC SERPL CALC-MCNC: 43 MG/DL
LYMPHOCYTES # BLD AUTO: 2.01 K/UL (ref 1–4.8)
LYMPHOCYTES NFR BLD: 26.7 % (ref 22–41)
MCH RBC QN AUTO: 31.7 PG (ref 27–33)
MCHC RBC AUTO-ENTMCNC: 32.7 G/DL (ref 33.7–35.3)
MCV RBC AUTO: 97.1 FL (ref 81.4–97.8)
MONOCYTES # BLD AUTO: 0.51 K/UL (ref 0–0.85)
MONOCYTES NFR BLD AUTO: 6.8 % (ref 0–13.4)
NEUTROPHILS # BLD AUTO: 4.63 K/UL (ref 1.82–7.42)
NEUTROPHILS NFR BLD: 61.5 % (ref 44–72)
NRBC # BLD AUTO: 0 K/UL
NRBC BLD-RTO: 0 /100 WBC
PLATELET # BLD AUTO: 261 K/UL (ref 164–446)
PMV BLD AUTO: 10.5 FL (ref 9–12.9)
POTASSIUM SERPL-SCNC: 3.9 MMOL/L (ref 3.6–5.5)
PROT SERPL-MCNC: 7.7 G/DL (ref 6–8.2)
PSA SERPL-MCNC: 0.42 NG/ML (ref 0–4)
RBC # BLD AUTO: 4.79 M/UL (ref 4.7–6.1)
SODIUM SERPL-SCNC: 141 MMOL/L (ref 135–145)
TRIGL SERPL-MCNC: 85 MG/DL (ref 0–149)
TSH SERPL DL<=0.005 MIU/L-ACNC: 1.67 UIU/ML (ref 0.38–5.33)
WBC # BLD AUTO: 7.5 K/UL (ref 4.8–10.8)

## 2020-10-27 PROCEDURE — 80061 LIPID PANEL: CPT

## 2020-10-27 PROCEDURE — 82306 VITAMIN D 25 HYDROXY: CPT

## 2020-10-27 PROCEDURE — 85025 COMPLETE CBC W/AUTO DIFF WBC: CPT

## 2020-10-27 PROCEDURE — 84443 ASSAY THYROID STIM HORMONE: CPT

## 2020-10-27 PROCEDURE — 83036 HEMOGLOBIN GLYCOSYLATED A1C: CPT

## 2020-10-27 PROCEDURE — 84153 ASSAY OF PSA TOTAL: CPT

## 2020-10-27 PROCEDURE — 36415 COLL VENOUS BLD VENIPUNCTURE: CPT

## 2020-10-27 PROCEDURE — 80053 COMPREHEN METABOLIC PANEL: CPT

## 2020-11-02 ENCOUNTER — TELEPHONE (OUTPATIENT)
Dept: MEDICAL GROUP | Facility: PHYSICIAN GROUP | Age: 60
End: 2020-11-02

## 2020-11-02 NOTE — TELEPHONE ENCOUNTER
----- Message from FRANNY Mckeon sent at 11/2/2020  6:45 AM PST -----  Please call patient and let them know his labs came back.  Overall they are quite stable.  His red blood cells have not recovered since last checked.  He was mildly anemic several months ago.  His vitamin D is quite low.  I recommend him taking a daily supplement that can be found over-the-counter of 2000 units D3.  If he adds a supplement K2 to this, it can help absorption.  One of his liver enzymes is mildly elevated.  This is been elevated in the past.  It is usually associated with a condition called fatty liver.  We will keep an eye on this and if it keeps increasing we can do an ultrasound of the liver.  This is a reversible condition where the body stores excess fat (fuel) in the liver.  However, if it stays there for a long time it can turn into scarring.  Fortunately, based on his labs it does not look like this is happening, but it is something that we will monitor.  Thanks, Erika VEGA

## 2020-11-03 NOTE — TELEPHONE ENCOUNTER
Relayed message below   Patient does want some clarification on his red blood cells he wants to know where he should be at and how long with it take for his red blood cells to recover.  Please advise

## 2021-02-23 ENCOUNTER — OFFICE VISIT (OUTPATIENT)
Dept: MEDICAL GROUP | Facility: PHYSICIAN GROUP | Age: 61
End: 2021-02-23
Payer: COMMERCIAL

## 2021-02-23 VITALS
DIASTOLIC BLOOD PRESSURE: 82 MMHG | BODY MASS INDEX: 45.82 KG/M2 | HEIGHT: 65 IN | HEART RATE: 95 BPM | OXYGEN SATURATION: 95 % | WEIGHT: 275 LBS | TEMPERATURE: 97.4 F | SYSTOLIC BLOOD PRESSURE: 126 MMHG

## 2021-02-23 DIAGNOSIS — F51.01 PRIMARY INSOMNIA: ICD-10-CM

## 2021-02-23 DIAGNOSIS — I63.9 CEREBROVASCULAR ACCIDENT (CVA), UNSPECIFIED MECHANISM (HCC): ICD-10-CM

## 2021-02-23 DIAGNOSIS — Z12.11 SCREEN FOR COLON CANCER: ICD-10-CM

## 2021-02-23 DIAGNOSIS — M17.0 PRIMARY OSTEOARTHRITIS OF BOTH KNEES: ICD-10-CM

## 2021-02-23 DIAGNOSIS — I65.1 BASILAR ARTERY OCCLUSION: ICD-10-CM

## 2021-02-23 DIAGNOSIS — I10 ESSENTIAL HYPERTENSION: ICD-10-CM

## 2021-02-23 PROCEDURE — 99214 OFFICE O/P EST MOD 30 MIN: CPT | Performed by: NURSE PRACTITIONER

## 2021-02-23 RX ORDER — CLOPIDOGREL BISULFATE 75 MG/1
75 TABLET ORAL DAILY
Qty: 90 TABLET | Refills: 3 | Status: SHIPPED | OUTPATIENT
Start: 2021-02-23

## 2021-02-23 RX ORDER — ATORVASTATIN CALCIUM 80 MG/1
80 TABLET, FILM COATED ORAL EVERY EVENING
Qty: 90 TABLET | Refills: 3 | Status: SHIPPED | OUTPATIENT
Start: 2021-02-23

## 2021-02-23 RX ORDER — LISINOPRIL 10 MG/1
10 TABLET ORAL DAILY
Qty: 90 TABLET | Refills: 3 | Status: SHIPPED | OUTPATIENT
Start: 2021-02-23

## 2021-02-23 RX ORDER — AMITRIPTYLINE HYDROCHLORIDE 50 MG/1
50 TABLET, FILM COATED ORAL
Qty: 90 TABLET | Refills: 3 | Status: SHIPPED | OUTPATIENT
Start: 2021-02-23

## 2021-02-23 RX ORDER — LISINOPRIL 10 MG/1
10 TABLET ORAL DAILY
Qty: 90 TABLET | Refills: 1 | Status: SHIPPED | OUTPATIENT
Start: 2021-02-23 | End: 2021-02-23

## 2021-02-23 ASSESSMENT — FIBROSIS 4 INDEX: FIB4 SCORE: 0.8

## 2021-02-23 ASSESSMENT — PATIENT HEALTH QUESTIONNAIRE - PHQ9: CLINICAL INTERPRETATION OF PHQ2 SCORE: 0

## 2021-02-23 NOTE — PATIENT INSTRUCTIONS
[facility]   [address] [place] [address] [id number]   [place] [id number]           Phone: [phone]     [facility]  [address] [place] [address]   [place] [id number]  Phone: [phone]

## 2021-02-23 NOTE — PROGRESS NOTES
Chief Complaint   Patient presents with   • Follow-Up         Subjective:     Shawn Duffy is a 60 y.o. male presenting for follow-up.    Osteoarthritis of bilateral knees: Chronic and uncontrolled.  Patient went through total knee arthroplasty of the right side in June 2020, is hopeful to get the left one done.  Has appointment in May with Dr. Johnston, his orthopedic surgeon.  Does feel like his right knee has been more painful over the last couple of months has been compensating with the left side.    We did discuss use of physical therapy in order to strengthen the supportive muscles preoperatively on the left side and to reduce the pain he is experiencing on the right.  Order placed, patient encouraged to follow-up.    History of stroke: Patient is stable on atorvastatin, high-dose; as well as antihypertensives.  Has not completed stroke Bridge clinic, contact information provided.  Denies any changes in vision, syncopal episodes, concerns.    Insomnia: Chronic and controlled.  Utilizes amitriptyline at night as well as an over-the-counter herbal sleep aid.  He is unsure the exact ingredients.  He is wondering if would be a good idea to get back on Ambien.  We did discuss this at length.  Due to the significant cognitive and memory associated side effects I think this would be best to be avoided at this time.  Amitriptyline can also have positive impact on chronic pain control.  I think we should revisit this topic after his left knee arthroplasty.  If Ambien is truly desired, I would recommend he follow-up with sleep medicine to discuss this further.      Review of systems:      Denies chest pain, shortness of breath, sore throat, difficulty swallowing, new cough, dizziness, severe headache, altered cognition, changes in bowel or bladder habits, decreased sensation, decreased strength, numbness or tingling, intolerable depression or anxiety, rash or skin concerns, changes in vision, fatigue, painful or  "swollen lymph nodes.       Current Outpatient Medications:   •  clopidogrel (PLAVIX) 75 MG Tab, Take 1 tablet by mouth every day., Disp: 90 tablet, Rfl: 3  •  atorvastatin (LIPITOR) 80 MG tablet, Take 1 tablet by mouth every evening., Disp: 90 tablet, Rfl: 3  •  amitriptyline (ELAVIL) 50 MG Tab, Take 1 tablet by mouth at bedtime as needed for Sleep., Disp: 90 tablet, Rfl: 3  •  lisinopril (PRINIVIL) 10 MG Tab, Take 1 tablet by mouth every day., Disp: 90 tablet, Rfl: 3    Allergies, past medical history, past surgical history, family history, social history reviewed and updated    Objective:     Vitals: /82 (BP Location: Right arm, Patient Position: Sitting, BP Cuff Size: Large adult)   Pulse 95   Temp 36.3 °C (97.4 °F) (Temporal)   Ht 1.651 m (5' 5\")   Wt 125 kg (275 lb)   SpO2 95%   BMI 45.76 kg/m²   Constitutional: Alert, no distress, well-groomed.  Skin: Warm, dry, good turgor, no rashes in visible areas.  Eye: Equal, round and reactive, conjunctiva clear, lids normal.  ENMT: Lips without lesions, good dentition, moist mucous membranes.  Neck: Trachea midline, no masses, no thyromegaly.  Respiratory: Unlabored respiratory effort, no cough.  MSK: Normal gait, moves all extremities.  Neuro: Grossly non-focal.   Psych: Alert and oriented x3, normal affect and mood.    Assessment/Plan:     Shawn was seen today for follow-up.    Diagnoses and all orders for this visit:    Essential hypertension  -     Discontinue: lisinopril (PRINIVIL) 10 MG Tab; Take 1 tablet by mouth every day.  -     lisinopril (PRINIVIL) 10 MG Tab; Take 1 tablet by mouth every day.    Cerebrovascular accident (CVA), unspecified mechanism (HCC)  -     clopidogrel (PLAVIX) 75 MG Tab; Take 1 tablet by mouth every day.  -     atorvastatin (LIPITOR) 80 MG tablet; Take 1 tablet by mouth every evening.    Basilar artery occlusion  -     clopidogrel (PLAVIX) 75 MG Tab; Take 1 tablet by mouth every day.  -     atorvastatin (LIPITOR) 80 MG " tablet; Take 1 tablet by mouth every evening.    Primary insomnia  -     amitriptyline (ELAVIL) 50 MG Tab; Take 1 tablet by mouth at bedtime as needed for Sleep.    Primary osteoarthritis of both knees  -     REFERRAL TO PHYSICAL THERAPY    Screen for colon cancer  -     OCCULT BLOOD FECES IMMUNOASSAY; Future          Return in about 6 months (around 8/23/2021).    Patient verbalized understanding and agreed to plan of care.  Encouraged to contact me with needs via [employer] or by phone if needed.      I have placed the above orders and discussed them with an approved delegating provider.  The MA is performing the below orders under the direction of Dr Olivier.    Please note that this dictation was created using voice recognition software. I have made every reasonable attempt to correct obvious errors, but I expect that there are errors of grammar and possibly content that I did not discover before finalizing the note.

## 2021-08-23 LAB
ALBUMIN SERPL-MCNC: 3.5 G/DL (ref 3.4–5)
ALP SERPL-CCNC: 90 U/L (ref 45–117)
ALT SERPL-CCNC: 32 U/L (ref 12–78)
ANION GAP SERPL CALC-SCNC: 9 MMOL/L (ref 5–15)
BILIRUB SERPL-MCNC: 1.3 MG/DL (ref 0.2–1)
CALCIUM SERPL-MCNC: 9.1 MG/DL (ref 8.5–10.1)
CHLORIDE SERPL-SCNC: 108 MMOL/L (ref 98–107)
CREAT SERPL-MCNC: 0.87 MG/DL (ref 0.7–1.3)
PROT SERPL-MCNC: 7.7 G/DL (ref 6.4–8.2)

## 2021-08-25 ENCOUNTER — HOSPITAL ENCOUNTER (OUTPATIENT)
Dept: HOSPITAL 8 - OUT | Age: 61
Discharge: HOME | End: 2021-08-25
Attending: ORTHOPAEDIC SURGERY
Payer: COMMERCIAL

## 2021-08-25 VITALS — BODY MASS INDEX: 47.27 KG/M2 | HEIGHT: 65 IN | WEIGHT: 283.73 LBS

## 2021-08-25 VITALS — DIASTOLIC BLOOD PRESSURE: 93 MMHG | SYSTOLIC BLOOD PRESSURE: 154 MMHG

## 2021-08-25 DIAGNOSIS — M25.862: ICD-10-CM

## 2021-08-25 DIAGNOSIS — M21.162: ICD-10-CM

## 2021-08-25 DIAGNOSIS — M25.762: ICD-10-CM

## 2021-08-25 DIAGNOSIS — Z79.02: ICD-10-CM

## 2021-08-25 DIAGNOSIS — Z79.899: ICD-10-CM

## 2021-08-25 DIAGNOSIS — Z86.73: ICD-10-CM

## 2021-08-25 DIAGNOSIS — G47.33: ICD-10-CM

## 2021-08-25 DIAGNOSIS — M17.12: Primary | ICD-10-CM

## 2021-08-25 PROCEDURE — 93005 ELECTROCARDIOGRAM TRACING: CPT

## 2021-08-25 PROCEDURE — 64447 NJX AA&/STRD FEMORAL NRV IMG: CPT

## 2021-08-25 PROCEDURE — 97110 THERAPEUTIC EXERCISES: CPT

## 2021-08-25 PROCEDURE — 73560 X-RAY EXAM OF KNEE 1 OR 2: CPT

## 2021-08-25 PROCEDURE — C1776 JOINT DEVICE (IMPLANTABLE): HCPCS

## 2021-08-25 PROCEDURE — 36415 COLL VENOUS BLD VENIPUNCTURE: CPT

## 2021-08-25 PROCEDURE — 80053 COMPREHEN METABOLIC PANEL: CPT

## 2021-08-25 PROCEDURE — 87081 CULTURE SCREEN ONLY: CPT

## 2021-08-25 PROCEDURE — 97161 PT EVAL LOW COMPLEX 20 MIN: CPT

## 2021-08-25 PROCEDURE — 27447 TOTAL KNEE ARTHROPLASTY: CPT

## 2021-08-25 PROCEDURE — C1713 ANCHOR/SCREW BN/BN,TIS/BN: HCPCS

## 2021-08-25 RX ADMIN — FENTANYL CITRATE PRN MCG: 50 INJECTION INTRAMUSCULAR; INTRAVENOUS at 11:10

## 2021-08-25 RX ADMIN — FENTANYL CITRATE PRN MCG: 50 INJECTION INTRAMUSCULAR; INTRAVENOUS at 10:45

## 2021-10-22 ENCOUNTER — OFFICE VISIT (OUTPATIENT)
Dept: URGENT CARE | Facility: PHYSICIAN GROUP | Age: 61
End: 2021-10-22
Payer: COMMERCIAL

## 2021-10-22 ENCOUNTER — APPOINTMENT (OUTPATIENT)
Dept: RADIOLOGY | Facility: IMAGING CENTER | Age: 61
End: 2021-10-22
Attending: PHYSICIAN ASSISTANT
Payer: COMMERCIAL

## 2021-10-22 VITALS
SYSTOLIC BLOOD PRESSURE: 128 MMHG | DIASTOLIC BLOOD PRESSURE: 84 MMHG | RESPIRATION RATE: 20 BRPM | HEART RATE: 112 BPM | WEIGHT: 240 LBS | OXYGEN SATURATION: 96 % | HEIGHT: 65 IN | TEMPERATURE: 98.2 F | BODY MASS INDEX: 39.99 KG/M2

## 2021-10-22 DIAGNOSIS — S92.321A CLOSED DISPLACED FRACTURE OF SECOND METATARSAL BONE OF RIGHT FOOT, INITIAL ENCOUNTER: ICD-10-CM

## 2021-10-22 DIAGNOSIS — S96.911A STRAIN OF RIGHT FOOT, INITIAL ENCOUNTER: ICD-10-CM

## 2021-10-22 PROCEDURE — 99214 OFFICE O/P EST MOD 30 MIN: CPT | Performed by: PHYSICIAN ASSISTANT

## 2021-10-22 PROCEDURE — 73630 X-RAY EXAM OF FOOT: CPT | Mod: TC,RT | Performed by: PHYSICIAN ASSISTANT

## 2021-10-22 RX ORDER — HYDROCODONE BITARTRATE AND ACETAMINOPHEN 10; 325 MG/1; MG/1
1 TABLET ORAL EVERY 8 HOURS PRN
Qty: 10 TABLET | Refills: 0 | Status: SHIPPED | OUTPATIENT
Start: 2021-10-22 | End: 2021-10-29

## 2021-10-22 ASSESSMENT — FIBROSIS 4 INDEX: FIB4 SCORE: 0.81

## 2021-10-22 ASSESSMENT — ENCOUNTER SYMPTOMS
SENSORY CHANGE: 0
FEVER: 0
TINGLING: 0
CHILLS: 0

## 2021-10-22 NOTE — PROGRESS NOTES
"Subjective:   Shawn Duffy  is a 61 y.o. male who presents for Leg Injury (happened yesturday dropped marble table on right foot , swelling and wanting xray done to make sure nothing broken )      Leg Injury   Pertinent negatives include no tingling.   Patient presents urgent care complaint of injury to right foot that occurred yesterday.  He states he was working with a marble table slab when it fell and landed with flat side down on top of right foot.  Patient denies history of surgery or significant injury to right foot.  Complains of diffuse swelling and pain.  Denies numbness tingling or weakness.  Complains of pain with weightbearing.  Has been using a cane.  Did have a hydrocodone at home which he took yesterday with minimal improved pain.    Review of Systems   Constitutional: Negative for chills and fever.   Musculoskeletal: Positive for joint pain ( Right foot).   Skin: Negative for rash.   Neurological: Negative for tingling and sensory change.       Allergies   Allergen Reactions   • Food      Almonds, peanuts, tree nuts   • Other Food      Legumes        Objective:   /84 (BP Location: Left arm, Patient Position: Sitting, BP Cuff Size: Large adult)   Pulse (!) 112   Temp 36.8 °C (98.2 °F) (Temporal)   Resp 20   Ht 1.651 m (5' 5\")   Wt 109 kg (240 lb)   SpO2 96%   BMI 39.94 kg/m²     Physical Exam  Vitals and nursing note reviewed.   Constitutional:       General: He is not in acute distress.     Appearance: He is well-developed. He is not diaphoretic.   HENT:      Head: Normocephalic and atraumatic.      Right Ear: External ear normal.      Left Ear: External ear normal.      Nose: Nose normal.   Eyes:      General: No scleral icterus.        Right eye: No discharge.         Left eye: No discharge.      Conjunctiva/sclera: Conjunctivae normal.   Pulmonary:      Effort: Pulmonary effort is normal. No respiratory distress.   Musculoskeletal:      Cervical back: Neck supple.      Right " foot: Decreased range of motion. Swelling ( diffuse) and tenderness ( diffuse to dorsum) present. No deformity, foot drop or laceration. Normal pulse.   Skin:     General: Skin is warm and dry.      Coloration: Skin is not pale.   Neurological:      Mental Status: He is alert and oriented to person, place, and time.      Coordination: Coordination normal.     dx foot -   IMPRESSION:     1.  Nondisplaced fracture at the proximal aspect RIGHT 2nd metatarsal shaft.  2.  Dorsal soft tissue swelling primarily over the midfoot.        Exam Ended: 10/22/21  2:32 PM Last Resulted: 10/22/21  2:40 PM            Assessment/Plan:   1. Closed displaced fracture of second metatarsal bone of right foot, initial encounter  - DX-FOOT-COMPLETE 3+ RIGHT; Future  - HYDROcodone/acetaminophen (NORCO)  MG Tab; Take 1 Tablet by mouth every 8 hours as needed for Moderate Pain for up to 7 days.  Dispense: 10 Tablet; Refill: 0  - Consent for Opiate Prescription  - REFERRAL TO ORTHOPEDICS  Recommend conservative care, rest, ice, elevation, patient sent with boot encouraged use with crutches with partial weightbearing, urgent referral to follow-up with orthopedics, Rx [place] for breakthrough pain per patient request, caution for sedation with medication focus on OTC Tylenol for primary pain control  Cautioned regarding potential for sedation with medication.  Return to clinic with lack of resolution or progression of symptoms.      I have worn an N95 mask, gloves and eye protection for the entire encounter with this patient.     Differential diagnosis, natural history, supportive care, and indications for immediate follow-up discussed.

## 2022-02-07 ENCOUNTER — OFFICE VISIT (OUTPATIENT)
Dept: URGENT CARE | Facility: PHYSICIAN GROUP | Age: 62
End: 2022-02-07
Payer: COMMERCIAL

## 2022-02-07 VITALS
RESPIRATION RATE: 18 BRPM | SYSTOLIC BLOOD PRESSURE: 124 MMHG | HEIGHT: 65 IN | WEIGHT: 287 LBS | HEART RATE: 82 BPM | BODY MASS INDEX: 47.82 KG/M2 | TEMPERATURE: 98.2 F | OXYGEN SATURATION: 96 % | DIASTOLIC BLOOD PRESSURE: 86 MMHG

## 2022-02-07 DIAGNOSIS — M79.645 PAIN OF FINGER OF LEFT HAND: ICD-10-CM

## 2022-02-07 DIAGNOSIS — L03.012 CELLULITIS OF LEFT LITTLE FINGER: ICD-10-CM

## 2022-02-07 PROCEDURE — 99213 OFFICE O/P EST LOW 20 MIN: CPT | Performed by: PHYSICIAN ASSISTANT

## 2022-02-07 RX ORDER — SULFAMETHOXAZOLE AND TRIMETHOPRIM 800; 160 MG/1; MG/1
1 TABLET ORAL 2 TIMES DAILY
Qty: 10 TABLET | Refills: 0 | Status: SHIPPED | OUTPATIENT
Start: 2022-02-07 | End: 2022-02-12

## 2022-02-07 RX ORDER — AMOXICILLIN 500 MG/1
500 CAPSULE ORAL 3 TIMES DAILY
Qty: 15 CAPSULE | Refills: 0 | Status: SHIPPED | OUTPATIENT
Start: 2022-02-07 | End: 2022-02-12

## 2022-02-07 RX ORDER — HYDROCODONE BITARTRATE AND ACETAMINOPHEN 5; 325 MG/1; MG/1
1 TABLET ORAL EVERY 6 HOURS PRN
Qty: 8 TABLET | Refills: 0 | Status: SHIPPED | OUTPATIENT
Start: 2022-02-07 | End: 2022-02-10

## 2022-02-07 ASSESSMENT — FIBROSIS 4 INDEX: FIB4 SCORE: 0.81

## 2022-02-08 ASSESSMENT — ENCOUNTER SYMPTOMS
TINGLING: 0
FEVER: 0
SENSORY CHANGE: 0
CHILLS: 0
VOMITING: 0
NAUSEA: 0
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
## 2023-04-10 ENCOUNTER — HOSPITAL ENCOUNTER (OUTPATIENT)
Dept: LAB | Facility: MEDICAL CENTER | Age: 63
End: 2023-04-10
Payer: COMMERCIAL

## 2023-04-10 ENCOUNTER — OFFICE VISIT (OUTPATIENT)
Dept: URGENT CARE | Facility: PHYSICIAN GROUP | Age: 63
End: 2023-04-10
Payer: COMMERCIAL

## 2023-04-10 VITALS
HEIGHT: 65 IN | RESPIRATION RATE: 20 BRPM | OXYGEN SATURATION: 95 % | WEIGHT: 295 LBS | HEART RATE: 89 BPM | SYSTOLIC BLOOD PRESSURE: 140 MMHG | DIASTOLIC BLOOD PRESSURE: 90 MMHG | TEMPERATURE: 98.4 F | BODY MASS INDEX: 49.15 KG/M2

## 2023-04-10 DIAGNOSIS — M10.9 ACUTE GOUT OF RIGHT HAND, UNSPECIFIED CAUSE: ICD-10-CM

## 2023-04-10 DIAGNOSIS — M25.441 FINGER JOINT SWELLING, RIGHT: ICD-10-CM

## 2023-04-10 DIAGNOSIS — L03.011 CELLULITIS OF FINGER OF RIGHT HAND: ICD-10-CM

## 2023-04-10 LAB
BASOPHILS # BLD AUTO: 0.4 % (ref 0–1.8)
BASOPHILS # BLD: 0.03 K/UL (ref 0–0.12)
EOSINOPHIL # BLD AUTO: 0.46 K/UL (ref 0–0.51)
EOSINOPHIL NFR BLD: 5.7 % (ref 0–6.9)
ERYTHROCYTE [DISTWIDTH] IN BLOOD BY AUTOMATED COUNT: 43.9 FL (ref 35.9–50)
HCT VFR BLD AUTO: 44.1 % (ref 42–52)
HGB BLD-MCNC: 15.4 G/DL (ref 14–18)
IMM GRANULOCYTES # BLD AUTO: 0.02 K/UL (ref 0–0.11)
IMM GRANULOCYTES NFR BLD AUTO: 0.2 % (ref 0–0.9)
LYMPHOCYTES # BLD AUTO: 1.86 K/UL (ref 1–4.8)
LYMPHOCYTES NFR BLD: 22.9 % (ref 22–41)
MCH RBC QN AUTO: 33 PG (ref 27–33)
MCHC RBC AUTO-ENTMCNC: 34.9 G/DL (ref 33.7–35.3)
MCV RBC AUTO: 94.6 FL (ref 81.4–97.8)
MONOCYTES # BLD AUTO: 0.66 K/UL (ref 0–0.85)
MONOCYTES NFR BLD AUTO: 8.1 % (ref 0–13.4)
NEUTROPHILS # BLD AUTO: 5.09 K/UL (ref 1.82–7.42)
NEUTROPHILS NFR BLD: 62.7 % (ref 44–72)
NRBC # BLD AUTO: 0 K/UL
NRBC BLD-RTO: 0 /100 WBC
PLATELET # BLD AUTO: 219 K/UL (ref 164–446)
PMV BLD AUTO: 10.3 FL (ref 9–12.9)
RBC # BLD AUTO: 4.66 M/UL (ref 4.7–6.1)
URATE SERPL-MCNC: 8.1 MG/DL (ref 2.5–8.3)
WBC # BLD AUTO: 8.1 K/UL (ref 4.8–10.8)

## 2023-04-10 PROCEDURE — 99213 OFFICE O/P EST LOW 20 MIN: CPT

## 2023-04-10 PROCEDURE — 84550 ASSAY OF BLOOD/URIC ACID: CPT

## 2023-04-10 PROCEDURE — 36415 COLL VENOUS BLD VENIPUNCTURE: CPT

## 2023-04-10 PROCEDURE — 85025 COMPLETE CBC W/AUTO DIFF WBC: CPT

## 2023-04-10 RX ORDER — INDOMETHACIN 50 MG/1
CAPSULE ORAL
Qty: 90 CAPSULE | Refills: 0 | Status: SHIPPED | OUTPATIENT
Start: 2023-04-10

## 2023-04-10 RX ORDER — SULFAMETHOXAZOLE AND TRIMETHOPRIM 800; 160 MG/1; MG/1
1 TABLET ORAL 2 TIMES DAILY
Qty: 10 TABLET | Refills: 0 | Status: SHIPPED | OUTPATIENT
Start: 2023-04-10 | End: 2023-04-15

## 2023-04-10 RX ORDER — LISINOPRIL 20 MG/1
TABLET ORAL
COMMUNITY
Start: 2023-04-07

## 2023-04-10 NOTE — PROGRESS NOTES
Chief Complaint   Patient presents with    Other     Right pinky swelling, no known injury x5 days        HISTORY OF PRESENT ILLNESS: Patient is a pleasant 62 y.o. male who presents to urgent care today for increasing right small finger swelling.  Especially at the joint.  No known history.  He has no known cause of injury.  No noted fevers.  Positive pain and swelling.      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 90 tablet 3    atorvastatin (LIPITOR) 80 MG tablet Take 1 tablet by mouth every evening. 90 tablet 3    amitriptyline (ELAVIL) 50 MG Tab Take 1 tablet by mouth at bedtime as needed for Sleep. 90 tablet 3    lisinopril (PRINIVIL) 10 MG Tab Take 1 tablet by mouth every day. (Patient not taking: Reported on 4/10/2023) 90 tablet 3     No current Epic-ordered facility-administered medications on file.       Past Medical History:   Diagnosis Date    Arthritis     Osteoarthritis    Eosinophilic esophagitis 6/22/2015    GERD (gastroesophageal reflux disease)     Hypertension     Obesity, unspecified     Other chronic nonalcoholic liver disease     Sleep apnea 12/22/2009    CPAP    Stroke (HCC) 11/2019    no residual effects       Social History     Tobacco Use    Smoking  "status: Never    Smokeless tobacco: Never   Vaping Use    Vaping Use: Never used   Substance Use Topics    Alcohol use: Yes     Comment: two every two weeks    Drug use: No       Family Status   Relation Name Status    Mo      Fa      Sis  Alive    Bro  Alive    Bro  Alive    Sumanth  Alive     Family History   Problem Relation Age of Onset    Arthritis Sister        ROS:  Review of Systems   Constitutional: Negative for fever, chills, weight loss, malaise, and fatigue.   HENT: Negative for ear pain, nosebleeds, congestion, sore throat and neck pain.    Eyes: Negative for vision changes.   Neuro: Negative for headache, sensory changes, weakness, seizure, LOC.   Cardiovascular: Negative for chest pain, palpitations, orthopnea and leg swelling.   Respiratory: Negative for cough, sputum production, shortness of breath and wheezing.   Gastrointestinal: Negative for abdominal pain, nausea, vomiting or diarrhea.   Genitourinary: Negative for dysuria, urgency and frequency.  Musculoskeletal: Negative for falls, neck pain, back pain, joint pain, myalgias.   Skin: Negative for rash, diaphoresis.  Noted swelling of the right small finger especially at the joint.  Pain with movement.  Mild redness    Exam:  BP (!) 140/90 (BP Location: Left arm, Patient Position: Sitting, BP Cuff Size: Adult)   Pulse 89   Temp 36.9 °C (98.4 °F) (Temporal)   Resp 20   Ht 1.651 m (5' 5\")   Wt (!) 134 kg (295 lb)   SpO2 95%   General: well-nourished, well-developed male in NAD  Head: normocephalic, atraumatic  Eyes: PERRLA, no conjunctival injection, acuity grossly intact, lids normal.  Ears: normal shape and symmetry, no tenderness, no discharge. External canals are without any significant edema or erythema. Tympanic membranes are without any inflammation, no effusion. Gross auditory acuity is intact.  Nose: symmetrical without tenderness, no discharge.  Mouth/Throat: reasonable hygiene, no erythema, exudates or tonsillar " enlargement.  Neck: no masses, range of motion within normal limits, no tracheal deviation. No obvious thyroid enlargement.   Lymph: no cervical adenopathy. No supraclavicular adenopathy.   Neuro: alert and oriented. Cranial nerves 1-12 grossly intact. No sensory deficit.   Cardiovascular: regular rate and rhythm. No edema.  Pulmonary: no distress. Chest is symmetrical with respiration, no wheezes, crackles, or rhonchi.   Abdomen: soft, non-tender, no guarding, no hepatosplenomegaly.  Musculoskeletal: no clubbing, appropriate muscle tone, gait is stable.  Skin: warm, dry, intact, no clubbing, no cyanosis, no rashes. Noted swelling of the right small finger especially at the joint.  Pain with movement.  Mild redness  Psych: appropriate mood, affect, judgement.         Assessment/Plan:  1. Finger joint swelling, right  URIC ACID    CBC WITH DIFFERENTIAL      2. Acute gout of right hand, unspecified cause  indomethacin (INDOCIN) 50 MG Cap      3. Cellulitis of finger of right hand  sulfamethoxazole-trimethoprim (BACTRIM DS) 800-160 MG tablet      This is a 62-year-old male who noted cute onset 5 days ago of right small finger swelling especially at the joint.  He has no known cause of injury, no prior history otherwise.  Patient is not a diabetic.  No prior history of gout.  On assessment he has noted swelling to this area especially at the joint, mild redness, pain with palpation, swelling is noted.  At this time I cannot rule out the potential for gout, cellulitis, labs were drawn uric acid and CBC, patient was started on a course of indomethacin, Bactrim, patient to be called if his uric acid is within normal limits.  Patient was advised of the plan of care and is agreeable to this at this time.  Advised to please follow-up if his swelling continues to get worse or he does not improve.    Supportive care, differential diagnoses, and indications for immediate follow-up discussed with patient.   Pathogenesis of  diagnosis discussed including typical length and natural progression.   Instructed to return to clinic or nearest emergency department for any change in condition, further concerns, or worsening of symptoms.  Patient states understanding of the plan of care and discharge instructions.  Instructed to make an appointment, for follow up, with  primary care provider.        Please note that this dictation was created using voice recognition software. I have made every reasonable attempt to correct obvious errors, but I expect that there are errors of grammar and possibly content that I did not discover before finalizing the note.      Matilda PEARSON

## 2023-06-12 NOTE — TELEPHONE ENCOUNTER
----- Message from Shilpa Roth P.A.-C. sent at 11/18/2019  9:39 PM PST -----  Please inform patient that his recent foot x-rays show arthritis in both feet which is most prominent in the great toe joint of his right foot. Continue to recommend that he follow up with podiatry as previously discussed. It looks like his referral got approved at [facility]. He can call [phone] to get this scheduled.  Shilpa Roth P.A.-C.    
Phone Number Called: [phone] (home)     Call outcome: left message for patient to call back regarding message below      
Pt informed.   
oral

## 2023-12-08 ENCOUNTER — OFFICE VISIT (OUTPATIENT)
Dept: URGENT CARE | Facility: PHYSICIAN GROUP | Age: 63
End: 2023-12-08
Payer: COMMERCIAL

## 2023-12-08 VITALS
BODY MASS INDEX: 44.98 KG/M2 | DIASTOLIC BLOOD PRESSURE: 70 MMHG | HEART RATE: 100 BPM | HEIGHT: 65 IN | SYSTOLIC BLOOD PRESSURE: 132 MMHG | OXYGEN SATURATION: 95 % | RESPIRATION RATE: 16 BRPM | TEMPERATURE: 97.8 F | WEIGHT: 270 LBS

## 2023-12-08 DIAGNOSIS — H66.002 NON-RECURRENT ACUTE SUPPURATIVE OTITIS MEDIA OF LEFT EAR WITHOUT SPONTANEOUS RUPTURE OF TYMPANIC MEMBRANE: ICD-10-CM

## 2023-12-08 PROCEDURE — 3075F SYST BP GE 130 - 139MM HG: CPT

## 2023-12-08 PROCEDURE — 99213 OFFICE O/P EST LOW 20 MIN: CPT

## 2023-12-08 PROCEDURE — 3078F DIAST BP <80 MM HG: CPT
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
## 2024-05-14 ENCOUNTER — OFFICE VISIT (OUTPATIENT)
Dept: URGENT CARE | Facility: PHYSICIAN GROUP | Age: 64
End: 2024-05-14
Payer: COMMERCIAL

## 2024-05-14 VITALS
OXYGEN SATURATION: 95 % | TEMPERATURE: 97.7 F | WEIGHT: 290 LBS | HEART RATE: 92 BPM | DIASTOLIC BLOOD PRESSURE: 70 MMHG | HEIGHT: 66 IN | BODY MASS INDEX: 46.61 KG/M2 | SYSTOLIC BLOOD PRESSURE: 124 MMHG | RESPIRATION RATE: 20 BRPM

## 2024-05-14 DIAGNOSIS — H66.002 ACUTE SUPPURATIVE OTITIS MEDIA OF LEFT EAR WITHOUT SPONTANEOUS RUPTURE OF TYMPANIC MEMBRANE, RECURRENCE NOT SPECIFIED: ICD-10-CM

## 2024-05-14 PROCEDURE — 3078F DIAST BP <80 MM HG: CPT | Performed by: STUDENT IN AN ORGANIZED HEALTH CARE EDUCATION/TRAINING PROGRAM

## 2024-05-14 PROCEDURE — 99213 OFFICE O/P EST LOW 20 MIN: CPT | Performed by: STUDENT IN AN ORGANIZED HEALTH CARE EDUCATION/TRAINING PROGRAM

## 2024-05-14 PROCEDURE — 3074F SYST BP LT 130 MM HG: CPT | Performed by: STUDENT IN AN ORGANIZED HEALTH CARE EDUCATION/TRAINING PROGRAM

## 2024-05-14 RX ORDER — AMOXICILLIN AND CLAVULANATE POTASSIUM 875; 125 MG/1; MG/1
1 TABLET, FILM COATED ORAL 2 TIMES DAILY
Qty: 10 TABLET | Refills: 0 | Status: SHIPPED | OUTPATIENT
Start: 2024-05-14 | End: 2024-05-19

## 2024-05-14 ASSESSMENT — ENCOUNTER SYMPTOMS
SHORTNESS OF BREATH: 0
FEVER: 0
VOMITING: 0
DIZZINESS: 0
WHEEZING: 0
HEADACHES: 0
PALPITATIONS: 0
ABDOMINAL PAIN: 0
CONSTIPATION: 0
NAUSEA: 0
SORE THROAT: 1
CHILLS: 0
COUGH: 0
DIARRHEA: 0

## 2024-05-14 NOTE — PROGRESS NOTES
"Subjective     Shawn Duffy is a 63 y.o. male who presents with Otalgia (Possible ear infection,x1 day)            Otalgia   There is pain in the left ear. This is a new problem. The current episode started yesterday. The problem has been gradually worsening. There has been no fever. The pain is moderate. Associated symptoms include a sore throat. Pertinent negatives include no abdominal pain, coughing, diarrhea, headaches or vomiting. He has tried nothing for the symptoms.       Review of Systems   Constitutional:  Negative for chills, fever and malaise/fatigue.   HENT:  Positive for ear pain and sore throat. Negative for congestion.    Respiratory:  Negative for cough, shortness of breath and wheezing.    Cardiovascular:  Negative for chest pain and palpitations.   Gastrointestinal:  Negative for abdominal pain, constipation, diarrhea, nausea and vomiting.   Neurological:  Negative for dizziness and headaches.   All other systems reviewed and are negative.             Objective     /70 (BP Location: Left arm, Patient Position: Sitting, BP Cuff Size: Large adult long)   Pulse 92   Temp 36.5 °C (97.7 °F) (Temporal)   Resp 20   Ht 1.676 m (5' 6\")   Wt (!) 132 kg (290 lb) Comment: per patient  SpO2 95%   BMI 46.81 kg/m²      Physical Exam  Vitals reviewed.   HENT:      Head: Normocephalic and atraumatic.      Right Ear: Ear canal and external ear normal.      Left Ear: Ear canal and external ear normal. Tympanic membrane is injected and bulging.      Nose: Nose normal.      Mouth/Throat:      Lips: Pink.      Mouth: Mucous membranes are moist.      Pharynx: Oropharynx is clear. Uvula midline. Posterior oropharyngeal erythema present.   Eyes:      Extraocular Movements: Extraocular movements intact.      Conjunctiva/sclera: Conjunctivae normal.      Pupils: Pupils are equal, round, and reactive to light.   Cardiovascular:      Rate and Rhythm: Normal rate.   Pulmonary:      Effort: Pulmonary effort is " normal.   Skin:     General: Skin is warm and dry.   Neurological:      General: No focal deficit present.      Mental Status: He is alert.                             Assessment & Plan        1. Acute suppurative otitis media of left ear without spontaneous rupture of tympanic membrane, recurrence not specified  - amoxicillin-clavulanate (AUGMENTIN) 875-125 MG Tab; Take 1 Tablet by mouth 2 times a day for 5 days.  Dispense: 10 Tablet; Refill: 0       Differential diagnoses, supportive care measures (OTC Tylenol/ibuprofen as needed for pain) and indications for immediate follow-up discussed with patient. Pathogenesis of diagnosis discussed including typical length and natural progression.      Instructed to return to urgent care or nearest emergency department if symptoms fail to improve, for any change in condition, further concerns, or new concerning symptoms.    Patient states understanding and agrees with the plan of care and discharge instructions.
